# Patient Record
Sex: MALE | Race: WHITE | NOT HISPANIC OR LATINO | Employment: OTHER | URBAN - METROPOLITAN AREA
[De-identification: names, ages, dates, MRNs, and addresses within clinical notes are randomized per-mention and may not be internally consistent; named-entity substitution may affect disease eponyms.]

---

## 2018-06-11 ENCOUNTER — OFFICE VISIT CONVERTED (OUTPATIENT)
Dept: SURGERY | Facility: CLINIC | Age: 63
End: 2018-06-11
Attending: UROLOGY

## 2018-07-03 ENCOUNTER — OFFICE VISIT CONVERTED (OUTPATIENT)
Dept: SURGERY | Facility: CLINIC | Age: 63
End: 2018-07-03
Attending: UROLOGY

## 2019-05-03 ENCOUNTER — HOSPITAL ENCOUNTER (OUTPATIENT)
Dept: CARDIOLOGY | Facility: HOSPITAL | Age: 64
Discharge: HOME | End: 2019-05-03
Attending: INTERNAL MEDICINE

## 2019-07-01 ENCOUNTER — HOSPITAL ENCOUNTER (OUTPATIENT)
Dept: LAB | Facility: HOSPITAL | Age: 64
Discharge: HOME | End: 2019-07-01

## 2019-07-01 LAB — PSA SERPL-MCNC: 12.88 NG/ML (ref 0–4)

## 2019-07-08 ENCOUNTER — OFFICE VISIT CONVERTED (OUTPATIENT)
Dept: SURGERY | Facility: CLINIC | Age: 64
End: 2019-07-08
Attending: UROLOGY

## 2019-08-02 ENCOUNTER — OFFICE VISIT CONVERTED (OUTPATIENT)
Dept: SURGERY | Facility: CLINIC | Age: 64
End: 2019-08-02
Attending: UROLOGY

## 2019-08-21 ENCOUNTER — HOSPITAL ENCOUNTER (OUTPATIENT)
Dept: PERIOP | Facility: HOSPITAL | Age: 64
Setting detail: HOSPITAL OUTPATIENT SURGERY
Discharge: HOME | End: 2019-08-21

## 2019-09-03 ENCOUNTER — CONVERSION ENCOUNTER (OUTPATIENT)
Dept: SURGERY | Facility: CLINIC | Age: 64
End: 2019-09-03

## 2019-09-03 ENCOUNTER — OFFICE VISIT CONVERTED (OUTPATIENT)
Dept: SURGERY | Facility: CLINIC | Age: 64
End: 2019-09-03
Attending: UROLOGY

## 2019-10-01 ENCOUNTER — OFFICE VISIT CONVERTED (OUTPATIENT)
Dept: CARDIOLOGY | Facility: CLINIC | Age: 64
End: 2019-10-01
Attending: SPECIALIST

## 2019-11-07 ENCOUNTER — CONVERSION ENCOUNTER (OUTPATIENT)
Dept: CARDIOLOGY | Facility: CLINIC | Age: 64
End: 2019-11-07
Attending: SPECIALIST

## 2020-02-19 ENCOUNTER — HOSPITAL ENCOUNTER (OUTPATIENT)
Dept: GASTROENTEROLOGY | Facility: HOSPITAL | Age: 65
Setting detail: HOSPITAL OUTPATIENT SURGERY
Discharge: HOME | End: 2020-02-19
Attending: INTERNAL MEDICINE

## 2020-05-29 ENCOUNTER — TELEMEDICINE CONVERTED (OUTPATIENT)
Dept: CARDIOLOGY | Facility: CLINIC | Age: 65
End: 2020-05-29
Attending: SPECIALIST

## 2020-10-13 ENCOUNTER — OFFICE VISIT CONVERTED (OUTPATIENT)
Dept: OTOLARYNGOLOGY | Facility: CLINIC | Age: 65
End: 2020-10-13
Attending: OTOLARYNGOLOGY

## 2020-10-14 ENCOUNTER — HOSPITAL ENCOUNTER (OUTPATIENT)
Dept: PREADMISSION TESTING | Facility: HOSPITAL | Age: 65
Discharge: HOME | End: 2020-10-14
Attending: OTOLARYNGOLOGY

## 2020-10-15 ENCOUNTER — HOSPITAL ENCOUNTER (OUTPATIENT)
Dept: LAB | Facility: HOSPITAL | Age: 65
Discharge: HOME | End: 2020-10-15

## 2020-10-15 LAB — PSA SERPL-MCNC: 10.36 NG/ML (ref 0–4)

## 2020-10-16 LAB — SARS-COV-2 RNA SPEC QL NAA+PROBE: NOT DETECTED

## 2020-10-19 ENCOUNTER — HOSPITAL ENCOUNTER (OUTPATIENT)
Dept: PERIOP | Facility: HOSPITAL | Age: 65
Setting detail: HOSPITAL OUTPATIENT SURGERY
Discharge: HOME | End: 2020-10-19
Attending: OTOLARYNGOLOGY

## 2020-10-27 ENCOUNTER — OFFICE VISIT CONVERTED (OUTPATIENT)
Dept: OTOLARYNGOLOGY | Facility: CLINIC | Age: 65
End: 2020-10-27
Attending: OTOLARYNGOLOGY

## 2020-10-29 ENCOUNTER — OFFICE VISIT CONVERTED (OUTPATIENT)
Dept: UROLOGY | Facility: CLINIC | Age: 65
End: 2020-10-29
Attending: UROLOGY

## 2020-11-05 ENCOUNTER — OFFICE VISIT CONVERTED (OUTPATIENT)
Dept: OTOLARYNGOLOGY | Facility: CLINIC | Age: 65
End: 2020-11-05
Attending: OTOLARYNGOLOGY

## 2020-11-24 ENCOUNTER — OFFICE VISIT CONVERTED (OUTPATIENT)
Dept: CARDIOLOGY | Facility: CLINIC | Age: 65
End: 2020-11-24
Attending: SPECIALIST

## 2020-12-11 ENCOUNTER — OFFICE VISIT CONVERTED (OUTPATIENT)
Dept: OTOLARYNGOLOGY | Facility: CLINIC | Age: 65
End: 2020-12-11
Attending: OTOLARYNGOLOGY

## 2021-03-16 ENCOUNTER — OFFICE VISIT CONVERTED (OUTPATIENT)
Dept: OTOLARYNGOLOGY | Facility: CLINIC | Age: 66
End: 2021-03-16
Attending: OTOLARYNGOLOGY

## 2021-05-10 NOTE — H&P
History and Physical      Patient Name: Michael Martin   Patient ID: [id number]   Sex: Male   YOB: 1955    Primary Care Provider: Uri Coughlin MD   Referring Provider: Seng Robles MD    Visit Date: October 13, 2020    Provider: Fernando Phillip MD   Location: [facility]   Location Address: [address] [place] [address], [address]  [id number]   Location Phone: [phone]          Chief Complaint     1.  Left temple infiltrative basal cell carcinoma       History Of Present Illness  Michael Martin is a 65 year old /White male who presents to the office today as a consult from Seng Robles MD.      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  He was referred by Dr. Robles, his Mohs surgeon.  The patient previously underwent a Mohs procedure 5 years ago and at that time was told that if there was any recurrence at this site that the patient may need radiation therapy instead of further surgery.  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  His eye closure was normal but he did have some lateralization of the lateral canthus after the procedure.  He noted abnormality along the scar several months ago and shave biopsy confirmed recurrence of the tumor.  He has had some discomfort at the site.  There was some concern that there may be deeper extension due to the physical exam finding of the tumor being fixed, as well as the concern of the proximity of the tumor to the eyelids.  I was consulted and discussed the case with Dr. Roblse over the phone prior to the patient's arrival here.       Past Medical History  Cancer of prostate; Heart Murmur; HTN (hypertension); Skin cancer; Tachycardia         Past Surgical History  Appendectomy; Colonoscopy; Prostate Biopsy; skin cancer  "removed         Medication List  Atacand 4 mg oral tablet; Crestor 10 mg oral tablet; Lotrel 5-20 mg oral capsule; metoprolol succinate 25 mg oral tablet extended release 24 hr; Toprol XL 25 mg oral tablet extended release 24 hr         Allergy List  NO KNOWN DRUG ALLERGIES         Family Medical History  No family history of colorectal cancer; Family history of diabetes mellitus         Social History  Alcohol (Current some day); ; Retired; Tobacco (Never)         Review of Systems  · Constitutional  o Denies  o : fever, night sweats, weight loss  · Eyes  o Denies  o : discharge from eye, impaired vision  · HENT  o Admits  o : *See HPI  · Cardiovascular  o Denies  o : chest pain, irregular heart beats  · Respiratory  o Denies  o : shortness of breath, wheezing, coughing up blood  · Gastrointestinal  o Denies  o : heartburn, reflux, vomiting blood  · Genitourinary  o Denies  o : frequency  · Integument  o Denies  o : rash, skin dryness  · Neurologic  o Denies  o : seizures, loss of balance, loss of consciousness, dizziness  · Endocrine  o Denies  o : cold intolerance, heat intolerance  · Heme-Lymph  o Denies  o : easy bleeding, anemia      Vitals  Date Time BP Position Site L\R Cuff Size HR RR TEMP (F) WT  HT  BMI kg/m2 BSA m2 O2 Sat FR L/min FiO2 HC       10/13/2020 10:16 AM        97.2 205lbs 4oz 5'  10.5\" 29.03 2.15             Physical Examination  · Constitutional  o Appearance  o : well developed, well-nourished, alert and in no acute distress, voice clear and strong  · Head and Face  o Head  o :   § Inspection  § : no deformities or lesions  o Face  o :   § Inspection  § : Contracted scar with ulcerative lesion along the left temporal area with decreased mobility, lateralization of the lateral canthus; House-Brackmann I/VI bilaterally  § Palpation  § : No TMJ crepitus nor  muscle tenderness bilaterally  · Eyes  o Vision  o :   § Visual Fields  § : Extraocular movements are intact. No " spontaneous or gaze-induced nystagmus.  o Conjunctivae  o : clear  o Sclerae  o : clear  o Pupils and Irises  o : pupils equal, round, and reactive to light.   · Ears, Nose, Mouth and Throat  o Ears  o :   § External Ears  § : appearance within normal limits, no lesions present  § Otoscopic Examination  § : tympanic membrane appearance within normal limits bilaterally without perforations, well-aerated middle ears  § Hearing  § : intact to conversational voice both ears  o Nose  o :   § External Nose  § : appearance normal  § Intranasal Exam  § : mucosa within normal limits, vestibules normal, no intranasal lesions present, septum midline, sinuses non tender to percussion  o Oral Cavity  o :   § Oral Mucosa  § : oral mucosa normal without pallor or cyanosis  § Lips  § : lip appearance normal  § Teeth  § : normal dentition for age  § Gums  § : gums pink, non-swollen, no bleeding present  § Tongue  § : tongue appearance normal; normal mobility  § Palate  § : hard palate normal, soft palate appearance normal with symmetric mobility  o Throat  o :   § Oropharynx  § : no inflammation or lesions present, tonsils within normal limits  § Hypopharynx  § : appearance within normal limits, superior epiglottis within normal limits  § Larynx  § : appearance within normal limits, vocal cords within normal limits, no lesions present  · Neck  o Inspection/Palpation  o : normal appearance, no masses or tenderness, trachea midline; thyroid size normal, nontender, no nodules or masses present on palpation  · Respiratory  o Respiratory Effort  o : breathing unlabored  o Inspection of Chest  o : normal appearance, no retractions  · Cardiovascular  o Heart  o : regular rate and rhythm  · Lymphatic  o Neck  o : no lymphadenopathy present  o Supraclavicular Nodes  o : no lymphadenopathy present  o Preauricular Nodes  o : no lymphadenopathy present  · Skin and Subcutaneous Tissue  o General Inspection  o : Regarding face and neck - there  are no rashes present, no lesions present, and no areas of discoloration  · Neurologic  o Cranial Nerves  o : cranial nerves II-XII are grossly intact bilaterally  o Gait and Station  o : normal gait, able to stand without diffculty  · Psychiatric  o Judgement and Insight  o : judgment and insight intact  o Mood and Affect  o : mood normal, affect appropriate          Assessment  · Pre-Surgical Orders     V72.84/Z01.818  · Infiltrative basal cell carcinoma     173.91/C44.91      Plan  · Orders  o General Surgery Order (GENOR) - V72.84/Z01.818 - 10/13/2020  · Medications  o Medications have been Reconciled  o Transition of Care or Provider Policy  · Instructions  o PLAN: Wide local excision of left temporal area/face recurrent infiltrative basal cell carcinoma, split-thickness skin graft  o Handouts Provided-Pre-Procedure Instructions including date and time and location of procedure.  o ****Surgical Orders****  o ****Patient Status****  o Outpatient  o ********************  o RISK AND BENEFITS: Reaction to anesthesia, pain, swelling, bleeding, infection, damage to surrounding structures, weakness or inability to completely close left eye, need for further procedures, recurrence of tumor, scar.  o Consent for surgery: Given these options, the patient has verbally expressed an understanding of the risks of surgery and finds these risks acceptable. We will proceed with surgery as soon as possible.  o Consult Anesthesia for a post-operative block, or any pain management procedure deemed necessary by the anesthesiologist for adequate post-operative pain control.   o O.R. PREP: Per protocol  o IV: Per Anesthesia  o PLEASE SIGN PERMIT FOR: Wide local excision of left temporal area/face recurrent infiltrative basal cell carcinoma, split-thickness skin graft  o *___The above History and Physical Examination has been completed within 30 days of admission.  o He presents the clinic today for evaluation of a recurrent  infiltrative basal cell carcinoma along the left temple. He was referred by Dr. Robles, his Mohs surgeon. The patient previously underwent a Mohs procedure 5 years ago and at that time was told that if there was any recurrence at this site that the patient may need radiation therapy instead of further surgery. 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 His eye closure was normal but he did have some lateralization of the lateral canthus after the procedure. He noted abnormality along the scar several months ago and shave biopsy confirmed recurrence of the tumor. He has had some discomfort at the site. There was some concern that there may be deeper extension due to the physical exam finding of the tumor being fixed, as well as the concern of the proximity of the tumor to the eyelids. I was consulted and discussed the case with Dr. Robles over the phone prior to the patient's arrival here. On examination he does have a lesion along the temporal area overlying his previous surgical site incision and scar. There was swelling and induration of the skin surrounding the area and the skin is partially fixed to the underlying tissues. Lesion borders extend within 7 mm of the lateral canthus superiorly. I had a lengthy discussion today with the patient and based on the recurrent nature of the tumor as well as the site, I would recommend wide local excision with likely split-thickness skin graft reconstruction. We discussed the procedure at length, including the possible complications as well as need to sacrifice lower branches of the facial nerve that may lead to incomplete eye closure. He understands that this may require him to have further procedures to achieve eye closure. I discussed obtaining a split-thickness skin graft from his lateral thigh. He notes that he understands and would like to  proceed. I will make arrangements to have him scheduled for this in the near future.  o Electronically Identified Patient Education Materials Provided Electronically  · Correspondence  o ENT Letter to Referring MD (Seng Robles MD) - 10/14/2020            Electronically Signed by: Fernando Phillip MD -Author on October 14, 2020 02:40:10 PM

## 2021-05-13 NOTE — PROGRESS NOTES
Progress Note      Patient Name: Michael Martin   Patient ID: [id number]   Sex: Male   YOB: 1955    Primary Care Provider: Uri Coughlin MD   Referring Provider: Seng Robles MD    Visit Date: November 5, 2020    Provider: Fernando Phillip MD   Location: [facility]   Location Address: [address] [place] [address], [address]  [id number]   Location Phone: [phone]          Chief Complaint     1.  Infiltrating basal cell carcinoma of the left temple       History Of Present Illness  Michael Martin is a 65 year old /White male who presents to the office today for a follow-up visit.      He presents the clinic today with some concerns regarding his surgical site after having excision of a recurrent infiltrative basal cell carcinoma of the left temple as well as skin grafting.  He noted that the skin graft appeared slightly different, and wanted to have this evaluated.  He has not had any significant pain at the surgical site, and has been healing well at both the resection site on his face, as well as the skin graft donor site.       Past Medical History  Cancer of prostate; Facial lesion; Heart Murmur; HTN (hypertension); Skin cancer; Tachycardia         Past Surgical History  Appendectomy; Colonoscopy; lesion removal; Prostate Biopsy; skin cancer removed         Medication List  Atacand 4 mg oral tablet; Crestor 10 mg oral tablet; Lotrel 5-20 mg oral capsule; sildenafil 100 mg oral tablet; Toprol XL 25 mg oral tablet extended release 24 hr         Allergy List  NO KNOWN DRUG ALLERGIES         Family Medical History  No family history of colorectal cancer; Family history of diabetes mellitus         Social History  Alcohol (Current some day); ; Retired; Tobacco (Never)         Review of Systems  · Constitutional  o Denies  o : fever, night sweats, weight loss  · Eyes  o Denies  o : discharge from eye, impaired vision  · HENT  o Admits  o : *See  "HPI  · Cardiovascular  o Denies  o : chest pain, irregular heart beats  · Respiratory  o Denies  o : shortness of breath, wheezing, coughing up blood  · Gastrointestinal  o Denies  o : heartburn, reflux, vomiting blood  · Genitourinary  o Denies  o : frequency  · Integument  o Denies  o : rash, skin dryness  · Neurologic  o Denies  o : seizures, loss of balance, loss of consciousness, dizziness  · Endocrine  o Denies  o : cold intolerance, heat intolerance  · Heme-Lymph  o Denies  o : easy bleeding, anemia      Vitals  Date Time BP Position Site L\R Cuff Size HR RR TEMP (F) WT  HT  BMI kg/m2 BSA m2 O2 Sat FR L/min FiO2 HC       11/05/2020 01:59 PM        96.6 203lbs 0oz 5'  10.5\" 28.72 2.14             Physical Examination  · Constitutional  o Appearance  o : well developed, well-nourished, alert and in no acute distress, voice clear and strong  · Head and Face  o Head  o :   § Inspection  § : no deformities or lesions  o Face  o :   § Inspection  § : Well-healing split-thickness skin graft along the left temple with 100% take and viability, sutures dissolving; House-Brackmann I/VI bilaterally  § Palpation  § : No TMJ crepitus nor  muscle tenderness bilaterally  · Eyes  o Vision  o :   § Visual Fields  § : Extraocular movements are intact. No spontaneous or gaze-induced nystagmus.  o Conjunctivae  o : clear  o Sclerae  o : clear  o Pupils and Irises  o : pupils equal, round, and reactive to light.   · Ears, Nose, Mouth and Throat  o Ears  o :   § External Ears  § : appearance within normal limits, no lesions present  § Otoscopic Examination  § : tympanic membrane appearance within normal limits bilaterally without perforations, well-aerated middle ears  § Hearing  § : intact to conversational voice both ears  o Nose  o :   § External Nose  § : appearance normal  § Intranasal Exam  § : mucosa within normal limits, vestibules normal, no intranasal lesions present, septum midline, sinuses non tender to " percussion  o Oral Cavity  o :   § Oral Mucosa  § : oral mucosa normal without pallor or cyanosis  § Lips  § : lip appearance normal  § Teeth  § : normal dentition for age  § Gums  § : gums pink, non-swollen, no bleeding present  § Tongue  § : tongue appearance normal; normal mobility  § Palate  § : hard palate normal, soft palate appearance normal with symmetric mobility  o Throat  o :   § Oropharynx  § : no inflammation or lesions present, tonsils within normal limits  § Hypopharynx  § : appearance within normal limits, superior epiglottis within normal limits  § Larynx  § : appearance within normal limits, vocal cords within normal limits, no lesions present  · Neck  o Inspection/Palpation  o : normal appearance, no masses or tenderness, trachea midline; thyroid size normal, nontender, no nodules or masses present on palpation  · Respiratory  o Respiratory Effort  o : breathing unlabored  o Inspection of Chest  o : normal appearance, no retractions  · Cardiovascular  o Heart  o : regular rate and rhythm  · Lymphatic  o Neck  o : no lymphadenopathy present  o Supraclavicular Nodes  o : no lymphadenopathy present  o Preauricular Nodes  o : no lymphadenopathy present  · Skin and Subcutaneous Tissue  o General Inspection  o : Regarding face and neck - there are no rashes present, no lesions present, and no areas of discoloration  · Neurologic  o Cranial Nerves  o : cranial nerves II-XII are grossly intact bilaterally  o Gait and Station  o : normal gait, able to stand without diffculty  · Psychiatric  o Judgement and Insight  o : judgment and insight intact  o Mood and Affect  o : mood normal, affect appropriate          Assessment  · Basal cell carcinoma     173.91/C44.91      Plan  · Medications  o Medications have been Reconciled  o Transition of Care or Provider Policy  · Instructions  o He presents the clinic today with some concerns regarding his surgical site after having excision of a recurrent infiltrative  basal cell carcinoma of the left temple as well as skin grafting. He noted that the skin graft appeared slightly different, and wanted to have this evaluated. He has not had any significant pain at the surgical site, and has been healing well at both the resection site on his face, as well as the skin graft donor site. On examination the trickle site appears to be healing well and the skin graft is 100% viable. The donor site appears well-healing. I reassured him, and we will plan to see him back in the clinic as previously scheduled, or sooner should there be any issues.  o Electronically Identified Patient Education Materials Provided Electronically            Electronically Signed by: Fernando Phillip MD -Author on November 12, 2020 12:51:47 PM

## 2021-05-13 NOTE — PROGRESS NOTES
Progress Note      Patient Name: Michael Martin   Patient ID: [id number]   Sex: Male   YOB: 1955    Primary Care Provider: Uri Coughlin MD   Referring Provider: Seng Robles MD    Visit Date: October 29, 2020    Provider: Megan Whiteside MD   Location: [facility]   Location Address: [address] [place] [address]  [id number]   Location Phone: [phone]          Chief Complaint  · pt is here for urological concerns      History Of Present Illness     65yo male referred by Dr. Mitchell for prostate cancer. He is on AS. He was diagnosed in Dec 2015. PSA at that time was 7.89. TRUS bx showed Rush 3+3 in 2 cores in left sided hypoechoic area.   The patient had repeat PSA in 7/16. At that time, it was 9.8. The patient had repeat TRUS bx and this showed Rush 3+3 in 1 core on the right side. The patient then had a repeat PSA in April 2017 and PSA increased to 17.4. He presents for eval. We discussed his PSA. He denies any new urinary symptoms. He is not on any meds for his prostate.   The patient does not know if there is a family history of prostate cancer in his family.    Patient now presents after having MRI of the prostate. There were no suspicious T2 localizing lesions in the prostate gland. The patnient did have an enlarged gland of 68gm and there was some chronic prostatitis. The patient and I reviewed this study. He would like to remain on AS. I explained that I prefer for patients to have PSA less than 10 to be on AS. He understands that he would be stretching the boundaries for AS.    The patient now presents after having prostate biopsy. He has done well from the biopsy. There was no cancer seen on the biopsy. The patient and I reviewed this. He would like to continue on AS.    The patient now presents in follow up. He is doing well. He denies any new urinary issues. The patient had a repeat PSA. His PSA in April was 17 and it is now down to 14. We reviewed this. The  patient would like to continue on AS. We discussed the need for an annual biopsy while on AS. He is aware of this.    6/11/18 - 61yo male presents in follow up. He is doing well. He had a recent PSA and this has gone down to 11.9 from 14. We discussed this. I would like to get an MRI of the prostate and see if there are any suspicious lesions in the prostate. From there, I would see him back. If that study is negative for suspicious lesions then I would hold on a biopsy. If there is a concerning lesion, I would do a fusion biopsy.    7/3/18 - Patient presents in follow up. The patient had an MRI of the prostate. This showed a volume of 71ml. There was no suspicious T2 signal within the peripheral zone favored to reflect chronic inflammation. There was no focal abnormality suspicious for clinically significant prostate cancer. We discussed this. I discussed holding on a biopsy this year given that his PSA is less and there were no lesions on the MRI. I would like to see him back in 1 year with a PSA. We discussed that typically a TRUS bx is recommended annually for AS patients, however, with his negative MRI. I would like to omit that. I think that next year, the patient will possibly need a biopsy.    7/8/19 - Patient presents in follow up. He is doing well. He denies any urinary issues. The patient had a recent PSA and this has increased slightly to 12.8. We discussed this. His last biopsy was in 2017 and was negative. His MRI was negative for localizing lesions in the prostate to suggest clinically significant prostate cancer. We discussed repeating this study and possibly doing a biopsy after as he has not had one in 2 years. He is agreeable to this.    8/2/19 - Patient presents in follow up. He had an MRI of the prostate. This showed a volume of 81gm. There were no localizing lesions to suggest significant prostate cancer. We discussed that. I discussed that the MRI is not always accurate. I discussed that it  "has been 2 years since last biopsy and his PSA is rising. I would feel better to do a TRUS bx of the prostate. He is in agreement with this. I discussed pain, bleeding, infection, ED as risks. He would like to proceed.    9/3/19 - Patient presents in follow up. He is doing well after his TRUS bx of the prostate. His path showed aytpia and inflammation, but no cancer. We discussed this. I have recommended observation. We discussed the possibility of a false negative and understaging.    10/29/20 - His most recent PSA is 10.36.  His prostate MRI last year was negative.  His biopsy was negative.  We will repeat his prostate MRI given he is on active surveillance.       Past Medical History  Cancer of prostate; Facial lesion; Heart Murmur; HTN (hypertension); Skin cancer; Tachycardia         Past Surgical History  Appendectomy; Colonoscopy; lesion removal; Prostate Biopsy; skin cancer removed         Medication List  Atacand 4 mg oral tablet; Crestor 10 mg oral tablet; Lotrel 5-20 mg oral capsule; sildenafil 100 mg oral tablet; Toprol XL 25 mg oral tablet extended release 24 hr         Allergy List  NO KNOWN DRUG ALLERGIES         Family Medical History  No family history of colorectal cancer; Family history of diabetes mellitus         Social History  Alcohol (Current some day); ; Retired; Tobacco (Never)         Review of Systems  · Constitutional  o Denies  o : chills, fever  · Gastrointestinal  o Denies  o : nausea, vomiting      Vitals  Date Time BP Position Site L\R Cuff Size HR RR TEMP (F) WT  HT  BMI kg/m2 BSA m2 O2 Sat FR L/min FiO2 HC       10/29/2020 04:40 /85 Sitting       204lbs 16oz 5'  10.5\" 29 2.15             Physical Examination  · Constitutional  o Appearance  o : well-nourished, well developed, alert, in no acute distress  · Head and Face  o Head  o :   § Inspection  § : atraumatic, normocephalic  o Face  o :   § Inspection  § : no facial lesions  · Eyes  o Sclerae  o : sclerae " white  · Ears, Nose, Mouth and Throat  o Ears  o :   § External Ears  § : appearance within normal limits, no lesions present  o Nose  o :   § External Nose  § : appearance normal  · Neck  o Inspection/Palpation  o : normal appearance, trachea midline  · Respiratory  o Respiratory Effort  o : breathing unlabored  · Gastrointestinal  o Abdominal Examination  o : abdomen nontender to palpation, tone normal without rigidity or guarding, no masses present, abdominal contour scaphoid  · Neurologic  o Mental Status Examination  o :   § Orientation  § : grossly oriented to person, place and time  § Speech/Language  § : communication ability within normal limits  o Gait and Station  o : normal gait, able to stand without difficulty  · Psychiatric  o Judgement and Insight  o : judgment and insight intact, judgement for everyday activities and social situations within normal limits, insight intact  o Mood and Affect  o : mood normal, affect appropriate          Results  · In-Office Procedures  o Lab procedure  § Automated dipstick urinalysis with microscopy (38152)   § Color Ur: Yellow   § Clarity Ur: Clear   § Glucose Ur Ql Strip: Negative   § Bilirub Ur Ql Strip: Negative   § Ketones Ur Ql Strip: Negative   § Sp Gr Ur Qn: 1.020   § Hgb Ur Ql Strip: Negative   § pH Ur-LsCnc: 5.5   § Prot Ur Ql Strip: Negative   § Urobilinogen Ur Strip-mCnc: 0.2   § Nitrite Ur Ql Strip: Negative   § WBC Est Ur Ql Strip: Trace   § RBC UrnS Qn HPF: 0   § WBC UrnS Qn HPF: 0   § Bacteria UrnS Qn HPF: 0   § Crystals UrnS Qn HPF: 0   § Epithelial Cells (non renal): 0 /HPF  § Epithelial Cells (renal): 0       Assessment  · Cancer of prostate     185/C61      Plan  · Orders  o PSA ultrasensitive DIAGNOSTIC [facility] (28419, 59307) - 185/C61 - 10/29/2020  o MRI prostate wo then w contrast (73940) - 185/C61 - 10/29/2020  · Medications  o Medications have been Reconciled  o Transition of Care or Provider Policy  · Instructions  o I will get a prostate MRI and  call with results.   o Electronically Identified Patient Education Materials Provided Electronically            Electronically Signed by: Megan Whiteside MD -Author on October 29, 2020 05:43:46 PM

## 2021-05-13 NOTE — PROGRESS NOTES
"   Progress Note      Patient Name: Michael Martin   Patient ID: [id number]   Sex: Male   YOB: 1955    Primary Care Provider: Uri Coughlin MD   Referring Provider: Seng Robles MD    Visit Date: November 24, 2020    Provider: Ilan Kim MD   Location: [facility]   Location Address: [address] [place] [address], [facility]   [place]  [id number]   Location Phone: [phone]          Chief Complaint     Palpitations.  Hypertension.       History Of Present Illness  Michael Martin is a 65 year old /White male with a history of hypertension and palpitations. No further palpitations. Blood pressure well controlled at home in the 120/70 range.   CURRENT MEDICATIONS: Lotrel 5-20 mg daily; Atacand 32 mg daily; Crestor 5 mg daily; metoprolol ER 25 mg daily; Viagra 100 mg p.r.n.   PAST MEDICAL HISTORY: Hyperlipidemia; Hypertension; Prostate cancer.   PSYCHOSOCIAL HISTORY: Moderate alcohol consumption.      ALLERGIES:  No known drug allergies.       Review of Systems  · Cardiovascular  o Admits  o : palpitations (fast, fluttering, or skipping beats)  o Denies  o : swelling (feet, ankles, hands), shortness of breath while walking or lying flat, chest pain or angina pectoris   · Respiratory  o Denies  o : chronic or frequent cough      Vitals  Date Time BP Position Site L\R Cuff Size HR RR TEMP (F) WT  HT  BMI kg/m2 BSA m2 O2 Sat FR L/min FiO2 HC       11/24/2020 09:32 /90 Sitting    62 - R   208lbs 0oz 5'  10.5\" 29.42 2.17       11/24/2020 09:32 /90 Sitting    64 - R                   Physical Examination  · Constitutional  o Appearance  o : Awake, alert, cooperative, pleasant.  · Respiratory  o Inspection of Chest  o : No chest wall deformities, moving equal.  o Auscultation of Lungs  o : Good air entry with vesicular breath sounds.  · Cardiovascular  o Heart  o :   § Auscultation of Heart  § : S1 and S2 regular. No S3. No S4.   o Peripheral Vascular System  o : "   § Extremities  § : Peripheral pulses were well felt. No edema. No cyanosis.  · Gastrointestinal  o Abdominal Examination  o : No masses or organomegaly noted.  · EKG  o EKG  o : Performed in the office today.  o Indications  o : Palpitations.  o Results  o : Sinus arrhythmia. Borderline first-degree AV block. Left atrial enlargement. Nonspecific T-wave abnormalities.           Assessment     ASSESSMENT & PLAN:    1.  Stable palpitations/paroxysmal SVT.  Continue metoprolol.  2.  Essential hypertension, controlled.  Continue Atacand and Lotrel.  3.  See me back in 6 months.             Electronically Signed by: Sofia Mehta-, Other -Author on December 1, 2020 12:05:21 PM  Electronically Co-signed by: Ilan Kim MD -Reviewer on December 16, 2020 10:50:38 AM

## 2021-05-13 NOTE — PROGRESS NOTES
Progress Note      Patient Name: Michael Martin   Patient ID: [id number]   Sex: Male   YOB: 1955    Primary Care Provider: Uri Coughlin MD   Referring Provider: Seng Robles MD    Visit Date: December 11, 2020    Provider: Fernando Phillip MD   Location: [facility]   Location Address: [address] [place] [address], [address]  [id number]   Location Phone: [phone]          Chief Complaint     1.  Basal cell carcinoma, infiltrating, of the left temple       History Of Present Illness  Michael Martin is a 65 year old /White male who presents to the office today for a follow-up visit.      Presents the clinic today for follow-up regarding recurrent left temporal infiltrating basal cell carcinoma which was excised and reconstructed using a split-thickness skin graft.  He has been doing very well, and has had no issues since I last saw him in the clinic a month ago.  The site has been healing well and he is having no issues.  His left thigh skin graft harvest site is completely healed.  He denies any pain or issues at the site.       Past Medical History  Cancer of prostate; Facial lesion; Heart Murmur; HTN (hypertension); Skin cancer; Tachycardia         Past Surgical History  Appendectomy; Colonoscopy; lesion removal; Prostate Biopsy; skin cancer removed         Medication List  Atacand 4 mg oral tablet; Crestor 10 mg oral tablet; Lotrel 5-20 mg oral capsule; sildenafil 100 mg oral tablet; Toprol XL 25 mg oral tablet extended release 24 hr         Allergy List  NO KNOWN DRUG ALLERGIES         Family Medical History  No family history of colorectal cancer; Family history of diabetes mellitus         Social History  Alcohol (Current some day); ; Retired; Tobacco (Never)         Review of Systems  · Constitutional  o Denies  o : fever, night sweats, weight loss  · Eyes  o Denies  o : discharge from eye, impaired vision  · HENT  o Admits  o : *See  "HPI  · Cardiovascular  o Denies  o : chest pain, irregular heart beats  · Respiratory  o Denies  o : shortness of breath, wheezing, coughing up blood  · Gastrointestinal  o Denies  o : heartburn, reflux, vomiting blood  · Genitourinary  o Denies  o : frequency  · Integument  o Denies  o : rash, skin dryness  · Neurologic  o Denies  o : seizures, loss of balance, loss of consciousness, dizziness  · Endocrine  o Denies  o : cold intolerance, heat intolerance  · Heme-Lymph  o Denies  o : easy bleeding, anemia      Vitals  Date Time BP Position Site L\R Cuff Size HR RR TEMP (F) WT  HT  BMI kg/m2 BSA m2 O2 Sat FR L/min FiO2 HC       12/11/2020 01:19 PM        97.4 210lbs 4oz 5'  10.5\" 29.74 2.18             Physical Examination  · Constitutional  o Appearance  o : well developed, well-nourished, alert and in no acute distress, voice clear and strong  · Head and Face  o Head  o :   § Inspection  § : Left temporal skin graft with 100% take, well-healed, no evidence of recurrent cancer  o Face  o :   § Inspection  § : No facial lesions; House-Brackmann I/VI bilaterally  § Palpation  § : No TMJ crepitus nor  muscle tenderness bilaterally  · Eyes  o Vision  o :   § Visual Fields  § : Extraocular movements are intact. No spontaneous or gaze-induced nystagmus.  o Conjunctivae  o : clear  o Sclerae  o : clear  o Pupils and Irises  o : pupils equal, round, and reactive to light.   · Ears, Nose, Mouth and Throat  o Ears  o :   § External Ears  § : appearance within normal limits, no lesions present  § Otoscopic Examination  § : tympanic membrane appearance within normal limits bilaterally without perforations, well-aerated middle ears  § Hearing  § : intact to conversational voice both ears  o Nose  o :   § External Nose  § : appearance normal  § Intranasal Exam  § : mucosa within normal limits, vestibules normal, no intranasal lesions present, septum midline, sinuses non tender to percussion  o Oral Cavity  o :   § Oral " Mucosa  § : oral mucosa normal without pallor or cyanosis  § Lips  § : lip appearance normal  § Teeth  § : normal dentition for age  § Gums  § : gums pink, non-swollen, no bleeding present  § Tongue  § : tongue appearance normal; normal mobility  § Palate  § : hard palate normal, soft palate appearance normal with symmetric mobility  o Throat  o :   § Oropharynx  § : no inflammation or lesions present, tonsils within normal limits  § Hypopharynx  § : appearance within normal limits, superior epiglottis within normal limits  § Larynx  § : appearance within normal limits, vocal cords within normal limits, no lesions present  · Neck  o Inspection/Palpation  o : normal appearance, no masses or tenderness, trachea midline; thyroid size normal, nontender, no nodules or masses present on palpation  · Respiratory  o Respiratory Effort  o : breathing unlabored  o Inspection of Chest  o : normal appearance, no retractions  · Cardiovascular  o Heart  o : regular rate and rhythm  · Lymphatic  o Neck  o : no lymphadenopathy present  o Supraclavicular Nodes  o : no lymphadenopathy present  o Preauricular Nodes  o : no lymphadenopathy present  · Skin and Subcutaneous Tissue  o General Inspection  o : Regarding face and neck - there are no rashes present, no lesions present, and no areas of discoloration  · Neurologic  o Cranial Nerves  o : cranial nerves II-XII are grossly intact bilaterally  o Gait and Station  o : normal gait, able to stand without diffculty  · Psychiatric  o Judgement and Insight  o : judgment and insight intact  o Mood and Affect  o : mood normal, affect appropriate          Assessment  · Basal cell carcinoma     173.91/C44.91      Plan  · Medications  o Medications have been Reconciled  o Transition of Care or Provider Policy  · Instructions  o Presents the clinic today for follow-up regarding recurrent left temporal infiltrating basal cell carcinoma which was excised and reconstructed using a split-thickness  skin graft. He has been doing very well, and has had no issues since I last saw him in the clinic a month ago. The site has been healing well and he is having no issues. His left thigh skin graft harvest site is completely healed. He denies any pain or issues at the site. On examination the surgical site appears well-healed with no evidence of recurrent cancer. His thigh is also well-healed. I will plan to see him back in the clinic in 3 months to reevaluate the site, or sooner should there be any issues.  o Electronically Identified Patient Education Materials Provided Electronically  · Correspondence  o ENT Letter to Referring MD (Seng Robles MD) - 12/15/2020            Electronically Signed by: Fernando Phillip MD -Author on December 15, 2020 02:16:15 PM

## 2021-05-13 NOTE — PROGRESS NOTES
Progress Note      Patient Name: Michael Martin   Patient ID: [id number]   Sex: Male   YOB: 1955    Primary Care Provider: Uri Coughlin MD   Referring Provider: Seng Robles MD    Visit Date: October 27, 2020    Provider: Fernando Phillip MD   Location: [facility]   Location Address: [address] [place] [address], [address] [place] [address]  [id number]   Location Phone: [phone]          Chief Complaint     1.  Infiltrating basal cell carcinoma, left temple of the face       History Of Present Illness  Michael Martin is a 65 year old /White male who presents to the office today for a follow-up visit.      He presents the clinic today for follow-up after having surgical excision of a recurrent infiltrative basal cell carcinoma of the left face.  He notes that he has been doing well, and has not had any issues after the procedure.  He notes that the pain is dissipating and he is no longer requiring any pain medications.  The bolster has stayed on.    Pathology is consistent with basal cell carcinoma, infiltrating type.  Close margins between 12 and 3:00 in 6-9 o'clock, but are clear.  Frozen section sent intraoperatively off of the posterior aspect of the zygomatic process of the maxilla were negative for carcinoma.       Past Medical History  Cancer of prostate; Facial lesion; Heart Murmur; HTN (hypertension); Skin cancer; Tachycardia         Past Surgical History  Appendectomy; Colonoscopy; lesion removal; Prostate Biopsy; skin cancer removed         Medication List  Atacand 4 mg oral tablet; Crestor 10 mg oral tablet; Lotrel 5-20 mg oral capsule; metoprolol succinate 25 mg oral tablet extended release 24 hr; Toprol XL 25 mg oral tablet extended release 24 hr         Allergy List  NO KNOWN DRUG ALLERGIES         Family Medical History  No family history of colorectal cancer; Family history of diabetes mellitus         Social History  Alcohol (Current some day); ; Retired;  "Tobacco (Never)         Review of Systems  · Constitutional  o Denies  o : fever, night sweats, weight loss  · Eyes  o Denies  o : discharge from eye, impaired vision  · HENT  o Admits  o : *See HPI  · Cardiovascular  o Denies  o : chest pain, irregular heart beats  · Respiratory  o Denies  o : shortness of breath, wheezing, coughing up blood  · Gastrointestinal  o Denies  o : heartburn, reflux, vomiting blood  · Genitourinary  o Denies  o : frequency  · Integument  o Denies  o : rash, skin dryness  · Neurologic  o Denies  o : seizures, loss of balance, loss of consciousness, dizziness  · Endocrine  o Denies  o : cold intolerance, heat intolerance  · Heme-Lymph  o Denies  o : easy bleeding, anemia      Vitals  Date Time BP Position Site L\R Cuff Size HR RR TEMP (F) WT  HT  BMI kg/m2 BSA m2 O2 Sat FR L/min FiO2 HC       10/27/2020 10:54 AM        97.4 207lbs 0oz 5'  10.5\" 29.28 2.16             Physical Examination  · Constitutional  o Appearance  o : well developed, well-nourished, alert and in no acute distress, voice clear and strong  · Head and Face  o Head  o :   § Inspection  § : no deformities or lesions  o Face  o :   § Inspection  § : The bolster dressing and staples were removed, revealing skin graft with 100% take.; House-Brackmann I/VI bilaterally, weakness of the frontalis muscle on the left which was present preoperatively, no weakness of eye closure on the left.  § Palpation  § : No TMJ crepitus nor  muscle tenderness bilaterally  · Eyes  o Vision  o :   § Visual Fields  § : Extraocular movements are intact. No spontaneous or gaze-induced nystagmus.  o Conjunctivae  o : clear  o Sclerae  o : clear  o Pupils and Irises  o : pupils equal, round, and reactive to light.   · Ears, Nose, Mouth and Throat  o Ears  o :   § External Ears  § : appearance within normal limits, no lesions present  § Otoscopic Examination  § : tympanic membrane appearance within normal limits bilaterally without " perforations, well-aerated middle ears  § Hearing  § : intact to conversational voice both ears  o Nose  o :   § External Nose  § : appearance normal  § Intranasal Exam  § : mucosa within normal limits, vestibules normal, no intranasal lesions present, septum midline, sinuses non tender to percussion  o Oral Cavity  o :   § Oral Mucosa  § : oral mucosa normal without pallor or cyanosis  § Lips  § : lip appearance normal  § Teeth  § : normal dentition for age  § Gums  § : gums pink, non-swollen, no bleeding present  § Tongue  § : tongue appearance normal; normal mobility  § Palate  § : hard palate normal, soft palate appearance normal with symmetric mobility  o Throat  o :   § Oropharynx  § : no inflammation or lesions present, tonsils within normal limits  § Hypopharynx  § : appearance within normal limits, superior epiglottis within normal limits  § Larynx  § : appearance within normal limits, vocal cords within normal limits, no lesions present  · Neck  o Inspection/Palpation  o : normal appearance, no masses or tenderness, trachea midline; thyroid size normal, nontender, no nodules or masses present on palpation  · Respiratory  o Respiratory Effort  o : breathing unlabored  o Inspection of Chest  o : normal appearance, no retractions  · Cardiovascular  o Heart  o : regular rate and rhythm  · Lymphatic  o Neck  o : no lymphadenopathy present  o Supraclavicular Nodes  o : no lymphadenopathy present  o Preauricular Nodes  o : no lymphadenopathy present  · Skin and Subcutaneous Tissue  o General Inspection  o : Regarding face and neck - there are no rashes present, no lesions present, and no areas of discoloration  · Neurologic  o Cranial Nerves  o : cranial nerves II-XII are grossly intact bilaterally  o Gait and Station  o : normal gait, able to stand without diffculty  · Psychiatric  o Judgement and Insight  o : judgment and insight intact  o Mood and Affect  o : mood normal, affect  appropriate          Assessment  · Basal cell carcinoma     173.91/C44.91      Plan  · Medications  o Medications have been Reconciled  o Transition of Care or Provider Policy  · Instructions  o He presents the clinic today for follow-up after having surgical excision of a recurrent infiltrative basal cell carcinoma of the left face. He notes that he has been doing well, and has not had any issues after the procedure. He notes that the pain is dissipating and he is no longer requiring any pain medications. The bolster has stayed on.Pathology is consistent with basal cell carcinoma, infiltrating type. Close margins between 12 and 3:00 in 6-9 o'clock, but are clear. Frozen section sent intraoperatively off of the posterior aspect of the zygomatic process of the maxilla were negative for carcinoma. On examination the bolster was removed and the skin graft appears to have 100% take. The wound is well-healing. I will have him continue doing some wound care at home. The split-thickness skin graft harvest site on the leg appears to be healing well, and I removed some of the dressing. I will plan to see him back in the clinic in 6 weeks or sooner should there be any issues.  o Electronically Identified Patient Education Materials Provided Electronically  · Correspondence  o ENT Letter to Referring MD (Seng Robles MD) - 10/27/2020            Electronically Signed by: Fernando Phillip MD -Author on October 27, 2020 03:23:16 PM

## 2021-05-13 NOTE — PROGRESS NOTES
Progress Note      Patient Name: Michael Martin   Patient ID: [id number]   Sex: Male   YOB: 1955    Primary Care Provider: Uri Coughlin MD   Referring Provider: Uir Coughlin MD    Visit Date: May 29, 2020    Provider: Ilan Kim MD   Location: [facility]   Location Address: [address] [place] [address]   [place]  [id number]   Location Phone: [phone]          History Of Present Illness  TELEHEALTH TELEPHONE VISIT  Michael Martin is a 64 year old /White male who is presenting for evaluation via telehealth telephone visit. Verbal consent obtained before beginning visit. Telehealth due to COVID-19. He has a history of hypertension and hyperlipidemia. Blood pressure is well controlled at home. No further palpitations.   Provider spent 5 minutes with the patient during the telehealth visit.   The following staff were present during this visit: Provider only.   CURRENT MEDICATIONS: include Toprol XL 25 mg daily; Atacand 32 mg daily; Lotrel 5/20 mg daily; Crestor 5 mg daily; Viagra 100 mg p.r.n. The dosage and frequency of the medications were reviewed with the patient.   PAST MEDICAL HISTORY: Hyperlipidemia; hypertension; prostate cancer.   FAMILY HISTORY: Unknown.   PSYCHOSOCIAL HISTORY: Unknown.       Review of Systems  · Cardiovascular  o Admits  o : palpitations (fast, fluttering, or skipping beats)  o Denies  o : swelling (feet, ankles, hands), shortness of breath while walking or lying flat, chest pain or angina pectoris   · Respiratory  o Denies  o : chronic or frequent cough, asthma or wheezing      Vitals     Per patient, at-home vitals are blood pressure 126/76, heart rate of 70.           Assessment     ASSESSMENT AND PLAN:    1.  Palpitations stable:  Continue current dose of Toprol.  2.  Essential hypertension controlled:  Continue current dose of Lotrel and Atacand.  3.  Hyperlipidemia:  Continue Crestor, managed by his PMD.    Ilan  MD Judy, [facility]  STUART/dmd           This note was transcribed by Marisa Drummond.  dmd/STUART  The above service was transcribed by Marisa Drummond, and I attest to the accuracy of the note.  STUART         Plan  · Instructions  o Plan Of Care:             Electronically Signed by: Marisa Drummond-, -Author on June 5, 2020 09:36:43 AM  Electronically Co-signed by: Ilan Kim MD -Reviewer on June 20, 2020 11:01:46 AM

## 2021-05-14 VITALS
DIASTOLIC BLOOD PRESSURE: 90 MMHG | SYSTOLIC BLOOD PRESSURE: 146 MMHG | BODY MASS INDEX: 29.78 KG/M2 | WEIGHT: 208 LBS | HEART RATE: 64 BPM | HEIGHT: 70 IN

## 2021-05-14 VITALS — HEIGHT: 70 IN | BODY MASS INDEX: 30.1 KG/M2 | WEIGHT: 210.25 LBS | TEMPERATURE: 97.4 F

## 2021-05-14 VITALS — BODY MASS INDEX: 29.71 KG/M2 | HEIGHT: 70 IN | TEMPERATURE: 97.3 F | WEIGHT: 207.5 LBS

## 2021-05-14 VITALS
WEIGHT: 205 LBS | DIASTOLIC BLOOD PRESSURE: 85 MMHG | SYSTOLIC BLOOD PRESSURE: 136 MMHG | HEIGHT: 70 IN | BODY MASS INDEX: 29.35 KG/M2

## 2021-05-14 VITALS — TEMPERATURE: 97.2 F | HEIGHT: 70 IN | BODY MASS INDEX: 29.38 KG/M2 | WEIGHT: 205.25 LBS

## 2021-05-14 VITALS — HEIGHT: 70 IN | WEIGHT: 207 LBS | BODY MASS INDEX: 29.63 KG/M2 | TEMPERATURE: 97.4 F

## 2021-05-14 VITALS — TEMPERATURE: 96.6 F | WEIGHT: 203 LBS | HEIGHT: 70 IN | BODY MASS INDEX: 29.06 KG/M2

## 2021-05-14 NOTE — PROGRESS NOTES
Progress Note      Patient Name: Michael Martin   Patient ID: [id number]   Sex: Male   YOB: 1955    Primary Care Provider: Uri Coughlin MD   Referring Provider: Seng Robles MD    Visit Date: March 16, 2021    Provider: Fernando Phillip MD   Location: [facility]   Location Address: [address] [place] [address], [address]  [id number]   Location Phone: [phone]          Chief Complaint     1.  Infiltrating basal cell carcinoma of the left temple       History Of Present Illness  Michael Martin is a 65 year old /White male who presents to the office today for a follow-up visit.      He presents the clinic today for follow-up regarding recurrent infiltrating basal cell carcinoma of the left temple for which she underwent reexcision and split-thickness skin grafting in October of 2020.  I last saw him for this in December, and he notes that he has been doing well without any significant issues.  He denies any pain at the site.  He has been followed by his dermatologist.  He denies any issues with the graft or surgical site.  There is a small area along the inferior junction between the skin graft and the cheek that the dermatologist had mentioned to him, but I thought this was an impacted gland.       Past Medical History  Cancer of prostate; Facial lesion; Heart Murmur; HTN (hypertension); Skin cancer; Tachycardia         Past Surgical History  Appendectomy; Colonoscopy; lesion removal; Prostate Biopsy; skin cancer removed         Medication List  Atacand 4 mg oral tablet; Crestor 10 mg oral tablet; Lotrel 5-20 mg oral capsule; sildenafil 100 mg oral tablet; Toprol XL 25 mg oral tablet extended release 24 hr         Allergy List  NO KNOWN DRUG ALLERGIES         Family Medical History  No family history of colorectal cancer; Family history of diabetes mellitus         Social History  Alcohol (Current some day); ; Retired; Tobacco (Never)         Review of  "Systems  · Constitutional  o Denies  o : fever, night sweats, weight loss  · Eyes  o Denies  o : discharge from eye, impaired vision  · HENT  o Admits  o : *See HPI  · Cardiovascular  o Denies  o : chest pain, irregular heart beats  · Respiratory  o Denies  o : shortness of breath, wheezing, coughing up blood  · Gastrointestinal  o Denies  o : heartburn, reflux, vomiting blood  · Genitourinary  o Denies  o : frequency  · Integument  o Denies  o : rash, skin dryness  · Neurologic  o Denies  o : seizures, loss of balance, loss of consciousness, dizziness  · Endocrine  o Denies  o : cold intolerance, heat intolerance  · Heme-Lymph  o Denies  o : easy bleeding, anemia      Vitals  Date Time BP Position Site L\R Cuff Size HR RR TEMP (F) WT  HT  BMI kg/m2 BSA m2 O2 Sat FR L/min FiO2 HC       03/16/2021 01:47 PM        97.3 207lbs 8oz 5'  10.5\" 29.35 2.16             Physical Examination  · Constitutional  o Appearance  o : well developed, well-nourished, alert and in no acute distress, voice clear and strong  · Head and Face  o Head  o :   § Inspection  § : no deformities or lesions  o Face  o :   § Inspection  § : Left facial skin graft with 100% take, microscopic evaluation of the graft reveals a comedome along the area of concern inferiorly; House-Brackmann I/VI bilaterally  § Palpation  § : No TMJ crepitus nor  muscle tenderness bilaterally  · Eyes  o Vision  o :   § Visual Fields  § : Extraocular movements are intact. No spontaneous or gaze-induced nystagmus.  o Conjunctivae  o : clear  o Sclerae  o : clear  o Pupils and Irises  o : pupils equal, round, and reactive to light.   · Ears, Nose, Mouth and Throat  o Ears  o :   § External Ears  § : appearance within normal limits, no lesions present  § Otoscopic Examination  § : tympanic membrane appearance within normal limits bilaterally without perforations, well-aerated middle ears  § Hearing  § : intact to conversational voice both ears  o Nose  o : "   § External Nose  § : appearance normal  § Intranasal Exam  § : mucosa within normal limits, vestibules normal, no intranasal lesions present, septum midline, sinuses non tender to percussion  o Oral Cavity  o :   § Oral Mucosa  § : oral mucosa normal without pallor or cyanosis  § Lips  § : lip appearance normal  § Teeth  § : normal dentition for age  § Gums  § : gums pink, non-swollen, no bleeding present  § Tongue  § : tongue appearance normal; normal mobility  § Palate  § : hard palate normal, soft palate appearance normal with symmetric mobility  o Throat  o :   § Oropharynx  § : no inflammation or lesions present, tonsils within normal limits  § Hypopharynx  § : appearance within normal limits, superior epiglottis within normal limits  § Larynx  § : appearance within normal limits, vocal cords within normal limits, no lesions present  · Neck  o Inspection/Palpation  o : normal appearance, no masses or tenderness, trachea midline; thyroid size normal, nontender, no nodules or masses present on palpation  · Respiratory  o Respiratory Effort  o : breathing unlabored  o Inspection of Chest  o : normal appearance, no retractions  · Cardiovascular  o Heart  o : regular rate and rhythm  · Lymphatic  o Neck  o : no lymphadenopathy present  o Supraclavicular Nodes  o : no lymphadenopathy present  o Preauricular Nodes  o : no lymphadenopathy present  · Skin and Subcutaneous Tissue  o General Inspection  o : Regarding face and neck - there are no rashes present, no lesions present, and no areas of discoloration  · Neurologic  o Cranial Nerves  o : cranial nerves II-XII are grossly intact bilaterally  o Gait and Station  o : normal gait, able to stand without diffculty  · Psychiatric  o Judgement and Insight  o : judgment and insight intact  o Mood and Affect  o : mood normal, affect appropriate          Assessment  · Basal cell carcinoma     173.91/C44.91      Plan  · Orders  o Microscopic exam [facility] ([id number]) -  173.91/C44.91 - 03/29/2021  · Medications  o Medications have been Reconciled  o Transition of Care or Provider Policy  · Instructions  o He presents the clinic today for follow-up regarding recurrent infiltrating basal cell carcinoma of the left temple for which she underwent reexcision and split-thickness skin grafting in October of 2020. I last saw him for this in December, and he notes that he has been doing well without any significant issues. He denies any pain at the site. He has been followed by his dermatologist. He denies any issues with the graft or surgical site. There is a small area along the inferior junction between the skin graft and the cheek that the dermatologist had mentioned to him, but I thought this was an impacted gland. Examination revealed a well healed skin graft with no evidence of recurrence. There is a small area which was assessed under the microscope and appears to be a comedome. I will have him keep an eye on this and let me know if there are any changes. If it is stable I will see him back in the clinic in December.  o Electronically Identified Patient Education Materials Provided Electronically            Electronically Signed by: Fernando Phillip MD -Author on March 29, 2021 03:10:33 PM

## 2021-05-15 VITALS
SYSTOLIC BLOOD PRESSURE: 148 MMHG | HEART RATE: 76 BPM | BODY MASS INDEX: 29.2 KG/M2 | DIASTOLIC BLOOD PRESSURE: 96 MMHG | HEIGHT: 70 IN | WEIGHT: 204 LBS

## 2021-05-15 VITALS — RESPIRATION RATE: 16 BRPM | HEIGHT: 71 IN | BODY MASS INDEX: 28.42 KG/M2 | WEIGHT: 203 LBS

## 2021-05-15 VITALS — BODY MASS INDEX: 28.42 KG/M2 | RESPIRATION RATE: 16 BRPM | HEIGHT: 71 IN | WEIGHT: 203 LBS

## 2021-05-15 VITALS — WEIGHT: 203 LBS | BODY MASS INDEX: 28.42 KG/M2 | HEIGHT: 71 IN | RESPIRATION RATE: 16 BRPM

## 2021-05-16 VITALS — BODY MASS INDEX: 28.42 KG/M2 | RESPIRATION RATE: 16 BRPM | WEIGHT: 203 LBS | HEIGHT: 71 IN

## 2021-05-16 VITALS — HEIGHT: 70 IN | BODY MASS INDEX: 29.06 KG/M2 | RESPIRATION RATE: 16 BRPM | WEIGHT: 203 LBS

## 2021-06-07 RX ORDER — CANDESARTAN 32 MG/1
32 TABLET ORAL DAILY
Qty: 90 TABLET | Refills: 1 | Status: SHIPPED | OUTPATIENT
Start: 2021-06-07

## 2021-06-07 RX ORDER — AMLODIPINE BESYLATE AND BENAZEPRIL HYDROCHLORIDE 5; 20 MG/1; MG/1
1 CAPSULE ORAL DAILY
Qty: 90 CAPSULE | Refills: 1 | Status: SHIPPED | OUTPATIENT
Start: 2021-06-07

## 2021-06-07 RX ORDER — AMLODIPINE BESYLATE AND BENAZEPRIL HYDROCHLORIDE 5; 20 MG/1; MG/1
CAPSULE ORAL
COMMUNITY
End: 2021-06-07 | Stop reason: SDUPTHER

## 2021-06-07 RX ORDER — CANDESARTAN 32 MG/1
TABLET ORAL
COMMUNITY
Start: 2021-04-23 | End: 2021-06-07 | Stop reason: SDUPTHER

## 2021-06-07 RX ORDER — METOPROLOL SUCCINATE 25 MG/1
25 TABLET, EXTENDED RELEASE ORAL DAILY
COMMUNITY
End: 2021-06-07 | Stop reason: SDUPTHER

## 2021-06-07 RX ORDER — METOPROLOL SUCCINATE 25 MG/1
25 TABLET, EXTENDED RELEASE ORAL DAILY
Qty: 90 TABLET | Refills: 1 | Status: SHIPPED | OUTPATIENT
Start: 2021-06-07 | End: 2021-06-08 | Stop reason: SDUPTHER

## 2021-06-08 DIAGNOSIS — R00.2 PALPITATIONS: Primary | ICD-10-CM

## 2021-06-08 RX ORDER — METOPROLOL SUCCINATE 25 MG/1
25 TABLET, EXTENDED RELEASE ORAL DAILY
Qty: 14 TABLET | Refills: 0 | Status: SHIPPED | OUTPATIENT
Start: 2021-06-08 | End: 2021-06-09 | Stop reason: SDUPTHER

## 2021-06-09 DIAGNOSIS — R00.2 PALPITATIONS: ICD-10-CM

## 2021-06-09 RX ORDER — METOPROLOL SUCCINATE 25 MG/1
25 TABLET, EXTENDED RELEASE ORAL DAILY
Qty: 14 TABLET | Refills: 0 | Status: SHIPPED | OUTPATIENT
Start: 2021-06-09 | End: 2021-06-10 | Stop reason: SDUPTHER

## 2021-06-09 RX ORDER — METOPROLOL SUCCINATE 25 MG/1
25 TABLET, EXTENDED RELEASE ORAL DAILY
Qty: 14 TABLET | Refills: 0 | Status: SHIPPED | OUTPATIENT
Start: 2021-06-09 | End: 2021-06-09 | Stop reason: SDUPTHER

## 2021-06-10 DIAGNOSIS — R00.2 PALPITATIONS: ICD-10-CM

## 2021-06-10 RX ORDER — METOPROLOL SUCCINATE 25 MG/1
25 TABLET, EXTENDED RELEASE ORAL DAILY
Qty: 90 TABLET | Refills: 1 | Status: SHIPPED | OUTPATIENT
Start: 2021-06-10 | End: 2021-12-09

## 2021-07-17 PROBLEM — R00.2 PALPITATIONS: Status: ACTIVE | Noted: 2021-07-17

## 2021-07-17 PROBLEM — E78.2 HYPERLIPEMIA, MIXED: Status: ACTIVE | Noted: 2021-07-17

## 2021-07-17 PROBLEM — I10 HYPERTENSION, ESSENTIAL: Status: ACTIVE | Noted: 2021-07-17

## 2021-07-17 NOTE — PROGRESS NOTES
[facility]  Cardiology progress Note    Patient Name: Michael Martin  : 1955   Chief Complaint      Palpitations.  Hypertension.           Subjective   Subjective       HPI:  Michael Martin is a 66 y.o. male with history of hypertension and palpitations.  No further palpitations.  Blood pressure well controlled at home.    Review of Systems:   Constitutional no fever,  no weight loss   Skin no rash   Otolaryngeal no difficulty swallowing   Cardiovascular See HPI   Pulmonary no cough, no sputum production   Gastrointestinal no constipation, no diarrhea   Genitourinary no dysuria, no hematuria   Hematologic no easy bruisability, no abnormal bleeding   Musculoskeletal no muscle pain   Neurologic no dizziness, no falls         Personal History     Social History:  reports that he has never smoked. He has never used smokeless tobacco. He reports current alcohol use. He reports that he does not use drugs.    Home Medications:  Current Outpatient Medications on File Prior to Visit   Medication Sig   • amLODIPine-benazepril (Lotrel) 5-20 MG per capsule Take 1 capsule by mouth Daily.   • candesartan (ATACAND) 32 MG tablet Take 1 tablet by mouth Daily.   • metoprolol succinate XL (TOPROL-XL) 25 MG 24 hr tablet Take 1 tablet by mouth Daily.   • rosuvastatin (CRESTOR) 5 MG tablet Take 5 mg by mouth Daily.   • sildenafil (VIAGRA) 100 MG tablet As Needed.     No current facility-administered medications on file prior to visit.     Allergies:  No Known Allergies    Objective    Objective       Vitals:   Heart Rate:  [58] 58  BP: (147-149)/(92-93) 149/93  Body mass index is 29.13 kg/m².     Physical Exam:   Constitutional: Awake, alert, No acute distress    Eyes: PERRLA, sclerae anicteric, no conjunctival injection   HENT: NCAT, mucous membranes moist   Neck: Supple, no thyromegaly, no lymphadenopathy, trachea midline   Respiratory: Clear to auscultation bilaterally, nonlabored respirations     Cardiovascular: RRR, no murmurs, rubs, or gallops, palpable pedal pulses bilaterally   Gastrointestinal: Positive bowel sounds, soft, nontender, nondistended   Musculoskeletal: No bilateral ankle edema, no clubbing or cyanosis to extremities   Psychiatric: Appropriate affect, cooperative   Neurologic: Oriented x 3, strength symmetric in all extremities, Cranial Nerves grossly intact to confrontation, speech clear   Skin: No rashes.    Result Review    Result Review:  I have personally reviewed the available results from  [x]  Laboratory  [x]  EKG  [x]  Cardiology  [x]  Medications  [x]  Old records  []  Other:   Procedures    Impression/Plan:  1.  Stable palpitations/paroxysmal SVT.  Continue metoprolol.  2.  Essential hypertension, controlled.  Continue Atacand and Lotrel.  Monitor blood pressure regularly.  Be on a low-salt diet.  Blood pressure is slightly high.  If persistently high readjust antihypertensive medication.  3.  Hyperlipidemia: Continue current dose of Crestor.  Low-fat diet advised.

## 2021-07-20 ENCOUNTER — OFFICE VISIT (OUTPATIENT)
Dept: CARDIOLOGY | Facility: CLINIC | Age: 66
End: 2021-07-20

## 2021-07-20 VITALS
WEIGHT: 203 LBS | DIASTOLIC BLOOD PRESSURE: 93 MMHG | SYSTOLIC BLOOD PRESSURE: 149 MMHG | HEART RATE: 58 BPM | HEIGHT: 70 IN | BODY MASS INDEX: 29.06 KG/M2

## 2021-07-20 DIAGNOSIS — E78.2 HYPERLIPEMIA, MIXED: ICD-10-CM

## 2021-07-20 DIAGNOSIS — R00.2 PALPITATIONS: Primary | ICD-10-CM

## 2021-07-20 DIAGNOSIS — I10 HYPERTENSION, ESSENTIAL: ICD-10-CM

## 2021-07-20 PROCEDURE — 99213 OFFICE O/P EST LOW 20 MIN: CPT | Performed by: SPECIALIST

## 2021-07-20 RX ORDER — SILDENAFIL 100 MG/1
TABLET, FILM COATED ORAL AS NEEDED
COMMUNITY
Start: 2021-05-20

## 2021-07-20 RX ORDER — ROSUVASTATIN CALCIUM 5 MG/1
5 TABLET, COATED ORAL DAILY
COMMUNITY
Start: 2021-04-23

## 2021-10-01 DIAGNOSIS — C61 PROSTATE CANCER (HCC): Primary | ICD-10-CM

## 2021-11-01 DIAGNOSIS — C61 PROSTATE CANCER (HCC): ICD-10-CM

## 2021-11-16 DIAGNOSIS — R00.2 PALPITATIONS: ICD-10-CM

## 2021-11-18 ENCOUNTER — LAB (OUTPATIENT)
Dept: LAB | Facility: HOSPITAL | Age: 66
End: 2021-11-18

## 2021-11-18 DIAGNOSIS — C61 PROSTATE CANCER (HCC): ICD-10-CM

## 2021-11-18 LAB — PSA SERPL-MCNC: 9.29 NG/ML (ref 0–4)

## 2021-11-18 PROCEDURE — 84153 ASSAY OF PSA TOTAL: CPT

## 2021-11-18 PROCEDURE — 36415 COLL VENOUS BLD VENIPUNCTURE: CPT

## 2021-11-22 ENCOUNTER — OFFICE VISIT (OUTPATIENT)
Dept: UROLOGY | Facility: CLINIC | Age: 66
End: 2021-11-22

## 2021-11-22 VITALS
SYSTOLIC BLOOD PRESSURE: 149 MMHG | DIASTOLIC BLOOD PRESSURE: 90 MMHG | BODY MASS INDEX: 29.63 KG/M2 | HEIGHT: 70 IN | WEIGHT: 207 LBS

## 2021-11-22 DIAGNOSIS — C61 PROSTATE CANCER (HCC): Primary | ICD-10-CM

## 2021-11-22 LAB
BILIRUB BLD-MCNC: NEGATIVE MG/DL
CLARITY, POC: CLEAR
COLOR UR: YELLOW
EXPIRATION DATE: ABNORMAL
GLUCOSE UR STRIP-MCNC: NEGATIVE MG/DL
KETONES UR QL: NEGATIVE
LEUKOCYTE EST, POC: ABNORMAL
Lab: ABNORMAL
NITRITE UR-MCNC: NEGATIVE MG/ML
PH UR: 6 [PH] (ref 5–8)
PROT UR STRIP-MCNC: NEGATIVE MG/DL
RBC # UR STRIP: ABNORMAL /UL
SP GR UR: 1.02 (ref 1–1.03)
UROBILINOGEN UR QL: NORMAL

## 2021-11-22 PROCEDURE — 99213 OFFICE O/P EST LOW 20 MIN: CPT | Performed by: UROLOGY

## 2021-11-22 PROCEDURE — 81003 URINALYSIS AUTO W/O SCOPE: CPT | Performed by: UROLOGY

## 2021-11-22 NOTE — PROGRESS NOTES
Chief Complaint  Follow-up (Annual Exam PSA and MRI)    Subjective          Verbal consent obtained for recording.    Michael Martin presents to [facility]  History of Present Illness    Mr. Martin presents today for follow-up for elevated PSA. Last year it was 10.36. He is a 66-year-old man on active surveillance for prostate cancer. His PSA now is currently 9 and had a repeat prostate MRI which showed benign prostatic hyperplasia and chronic prostatitis. But no lesions concerning for significant prostate cancer were seen.      63yo male referred by Dr. Mitchell for prostate cancer. He is on AS. He was diagnosed in Dec 2015. PSA at that time was 7.89. TRUS bx showed [place] 3+3 in 2 cores in left sided hypoechoic area.   The patient had repeat PSA in 7/16. At that time, it was 9.8. The patient had repeat TRUS bx and this showed [place] 3+3 in 1 core on the right side. The patient then had a repeat PSA in April 2017 and PSA increased to 17.4. He presents for eval. We discussed his PSA. He denies any new urinary symptoms. He is not on any meds for his prostate.   The patient does not know if there is a family history of prostate cancer in his family.    Patient now presents after having MRI of the prostate. There were no suspicious T2 localizing lesions in the prostate gland. The patnient did have an enlarged gland of 68gm and there was some chronic prostatitis. The patient and I reviewed this study. He would like to remain on AS. I explained that I prefer for patients to have PSA less than 10 to be on AS. He understands that he would be stretching the boundaries for AS.    The patient now presents after having prostate biopsy. He has done well from the biopsy. There was no cancer seen on the biopsy. The patient and I reviewed this. He would like to continue on AS.    The patient now presents in follow up. He is doing well. He denies any new urinary issues. The patient had a repeat PSA. His  PSA in April was 17 and it is now down to 14. We reviewed this. The patient would like to continue on AS. We discussed the need for an annual biopsy while on AS. He is aware of this.    6/11/18 - 61yo male presents in follow up. He is doing well. He had a recent PSA and this has gone down to 11.9 from 14. We discussed this. I would like to get an MRI of the prostate and see if there are any suspicious lesions in the prostate. From there, I would see him back. If that study is negative for suspicious lesions then I would hold on a biopsy. If there is a concerning lesion, I would do a fusion biopsy.    7/3/18 - Patient presents in follow up. The patient had an MRI of the prostate. This showed a volume of 71ml. There was no suspicious T2 signal within the peripheral zone favored to reflect chronic inflammation. There was no focal abnormality suspicious for clinically significant prostate cancer. We discussed this. I discussed holding on a biopsy this year given that his PSA is less and there were no lesions on the MRI. I would like to see him back in 1 year with a PSA. We discussed that typically a TRUS bx is recommended annually for AS patients, however, with his negative MRI. I would like to omit that. I think that next year, the patient will possibly need a biopsy.    7/8/19 - Patient presents in follow up. He is doing well. He denies any urinary issues. The patient had a recent PSA and this has increased slightly to 12.8. We discussed this. His last biopsy was in 2017 and was negative. His MRI was negative for localizing lesions in the prostate to suggest clinically significant prostate cancer. We discussed repeating this study and possibly doing a biopsy after as he has not had one in 2 years. He is agreeable to this.    8/2/19 - Patient presents in follow up. He had an MRI of the prostate. This showed a volume of 81gm. There were no localizing lesions to suggest significant prostate cancer. We discussed that. I  "discussed that the MRI is not always accurate. I discussed that it has been 2 years since last biopsy and his PSA is rising. I would feel better to do a TRUS bx of the prostate. He is in agreement with this. I discussed pain, bleeding, infection, ED as risks. He would like to proceed.    9/3/19 - Patient presents in follow up. He is doing well after his TRUS bx of the prostate. His path showed aytpia and inflammation, but no cancer. We discussed this. I have recommended observation. We discussed the possibility of a false negative and understaging.    10/29/20 - His most recent PSA is 10.36.  His prostate MRI last year was negative.  His biopsy was negative.  We will repeat his prostate MRI given he is on active surveillance.    11/22/2021-  Mr. Martin states that he is doing good. He denies that he has anything else going on.  His most recent PSA is 9.  His prostate MRI was negative for lesions concerning for prostate cancer.     Objective   Vital Signs:   /90   Ht 177.8 cm (70\")   Wt 93.9 kg (207 lb)   BMI 29.70 kg/m²     Physical Exam  Vitals and nursing note reviewed.   Constitutional:       Appearance: Normal appearance. He is well-developed.   Pulmonary:      Effort: Pulmonary effort is normal.      Breath sounds: Normal air entry.   Neurological:      Mental Status: He is alert and oriented to person, place, and time.      Motor: Motor function is intact.   Psychiatric:         Mood and Affect: Mood normal.         Behavior: Behavior normal.        Result Review :            MRI Prostate With & Without Contrast (1955)            Results for orders placed or performed in visit on 11/22/21   POC Urinalysis Dipstick, Automated    Specimen: Urine   Result Value Ref Range    Color Yellow Yellow, Straw, Dark Yellow, Rosalind    Clarity, UA Clear Clear    Specific Gravity  1.025 1.005 - 1.030    pH, Urine 6.0 5.0 - 8.0    Leukocytes Trace (A) Negative    Nitrite, UA Negative Negative    Protein, POC " Negative Negative mg/dL    Glucose, UA Negative Negative, 1000 mg/dL (3+) mg/dL    Ketones, UA Negative Negative    Urobilinogen, UA Normal Normal    Bilirubin Negative Negative    Blood, UA Trace (A) Negative    Lot Number [id number]     Expiration Date 11/30/2022        Assessment and Plan    Diagnoses and all orders for this visit:    1. Prostate cancer (HCC) (Primary)  -     POC Urinalysis Dipstick, Automated  -     MRI Prostate With & Without Contrast; Future  -     PSA DIAGNOSTIC; Future      Prostate cancer  - We will repeat PSA and MRI in 1 year.  - He will follow up in 1 year, otherwise I will see him sooner if he needs me.      Follow Up   No follow-ups on file.  Patient was given instructions and counseling regarding his condition or for health maintenance advice. Please see specific information pulled into the AVS if appropriate.     Transcribed from ambient dictation for Megan Whiteside MD by Marjorie Miles.  11/22/21   12:57 EST    Patient verbalized consent to the visit recording.  I have personally performed the services described in this document as transcribed by the above individual, and it is both accurate and complete.  Megan Whiteside MD  11/22/2021  13:28 EST

## 2021-12-02 ENCOUNTER — OFFICE VISIT (OUTPATIENT)
Dept: OTOLARYNGOLOGY | Facility: CLINIC | Age: 66
End: 2021-12-02

## 2021-12-02 VITALS — HEIGHT: 70 IN | BODY MASS INDEX: 30.01 KG/M2 | TEMPERATURE: 97.2 F | WEIGHT: 209.6 LBS

## 2021-12-02 DIAGNOSIS — C44.310 BASAL CELL CARCINOMA, FACE: Primary | ICD-10-CM

## 2021-12-02 PROCEDURE — 99213 OFFICE O/P EST LOW 20 MIN: CPT | Performed by: OTOLARYNGOLOGY

## 2021-12-02 NOTE — PROGRESS NOTES
Patient Name: Michael Martin   Visit Date: 12/02/2021   Patient ID: [id number]  Provider: Fernando Phillip MD    Sex: male  Location: [facility]   YOB: 1955  Location Address: [address] [place] [address], [address] [place] [address],?[place]?[id number]    Primary Care Provider Uri Coughlin MD  Location Phone: [phone]    Referring Provider: No ref. provider found        Chief Complaint  Follow-up (9 month f/u CA left temple )    Subjective    History of Present Illness  Michael Martin is a 66 y.o. male who presents to [facility] today as a consult from No ref. provider found.    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  He has been doing well, and has not had any significant issues with the surgical site.  He denies any pain in this area.  He is seeing a plastic surgeon in [place] for fat grafting to the site.  He continues to be under the care of his dermatologist who sees him every 3 to 6 months.    Past Medical History:   Diagnosis Date   • Cancer of prostate (HCC)    • Colon polyps    • Facial lesion    • Heart murmur    • Hyperlipidemia    • Hypertension    • Skin cancer    • Tachycardia        Past Surgical History:   Procedure Laterality Date   • ADENOIDECTOMY  April 1987   • APPENDECTOMY     • COLONOSCOPY  08/23/2016    TREMAINE HI, H/O COLON POLYPS   • COSMETIC SURGERY  Oct 2021   • EXCISION LESION     • PROSTATE BIOPSY  2007    NEG   • SKIN CANCER EXCISION           Current Outpatient Medications:   •  amLODIPine-benazepril (Lotrel) 5-20 MG per capsule, Take 1 capsule by mouth Daily., Disp: 90 capsule, Rfl: 1  •  candesartan (ATACAND) 32 MG tablet, Take 1 tablet by mouth Daily., Disp: 90 tablet, Rfl: 1  •  metoprolol succinate XL (TOPROL-XL) 25 MG 24 hr tablet, Take 1 tablet by  "mouth Daily., Disp: 90 tablet, Rfl: 1  •  rosuvastatin (CRESTOR) 5 MG tablet, Take 5 mg by mouth Daily., Disp: , Rfl:   •  sildenafil (VIAGRA) 100 MG tablet, As Needed., Disp: , Rfl:      No Known Allergies    Family History   Problem Relation Age of Onset   • Diabetes Mother         Social History     Social History Narrative   • Not on file       Objective     Vital Signs:   Temp 97.2 °F (36.2 °C) (Temporal)   Ht 177.8 cm (70\")   Wt 95.1 kg (209 lb 9.6 oz)   BMI 30.07 kg/m²       Physical Exam         Constitutional   Appearance  · : well developed, well-nourished, alert and in no acute distress, voice clear and strong    Head  Inspection  · : no deformities or lesions  Face  Inspection  · : No facial lesions.  Well-healed split-thickness skin graft with no palpable tumor or visible disease; House-Brackmann I/VI bilaterally  Palpation  · : No TMJ crepitus nor  muscle tenderness bilaterally    Eyes  Vision  Visual Fields  · : Extraocular movements are intact. No spontaneous or gaze-induced nystagmus.  Conjunctivae  · : clear  Sclerae  · : clear  Pupils and Irises  · : pupils equal, round, and reactive to light.     Ears, Nose, Mouth and Throat    Ears    External Ears  · : appearance within normal limits, no lesions present  Otoscopic Examination  · : Tympanic membrane appearance within normal limits bilaterally without perforations, well-aerated middle ears  Hearing  · : intact to conversational voice both ears  Tunning fork testing:     :    Nose    External Nose  · : appearance normal  Intranasal Exam  · : mucosa within normal limits, vestibules normal, no intranasal lesions present, septum midline, sinuses non tender to percussion  Oral Cavity    Oral Mucosa  · : oral mucosa normal without pallor or cyanosis  Lips  · : lip appearance normal  Teeth  · : normal dentition for age  Gums  · : gums pink, non-swollen, no bleeding present  Tongue  · : tongue appearance normal; normal mobility  Palate  · : " hard palate normal, soft palate appearance normal with symmetric mobility    Throat    Oropharynx  · : no inflammation or lesions present, tonsils within normal limits  Hypopharynx  · : appearance within normal limits, superior epiglottis within normal limits  Larynx  · : appearance within normal limits, vocal cords within normal limits, no lesions present    Neck  Inspection/Palpation  · : normal appearance, no masses or tenderness, trachea midline; thyroid size normal, nontender, no nodules or masses present on palpation    Respiratory  Respiratory Effort  · : breathing unlabored  Inspection of Chest  · : normal appearance, no retractions    Cardiovascular  Heart  · : regular rate and rhythm    Lymphatic  Neck  · : no lymphadenopathy present  Supraclavicular Nodes  · : no lymphadenopathy present  Preauricular Nodes  · : no lymphadenopathy present    Skin and Subcutaneous Tissue  General Inspection  · : Regarding face and neck - there are no rashes present, no lesions present, and no areas of discoloration    Neurologic  Cranial Nerves  · : cranial nerves II-XII are grossly intact bilaterally  Gait and Station  · : normal gait, able to stand without diffculty    Psychiatric  Judgement and Insight  · : judgment and insight intact  Mood and Affect  · : mood normal, affect appropriate          Assessment and Plan    Diagnoses and all orders for this visit:    1. Basal cell carcinoma, face (Primary)    Examination today revealed well-healed split-thickness skin graft with no evidence of palpable tumor or visible disease.  Since he is followed by his dermatologist, I gave him the option of continuing to follow with me or to just be checked by his dermatologist.  He prefers to have his dermatologist check the area since he is frequently seen there.  He understands that I will be glad to see him should there be any issues.    Follow Up   No follow-ups on file.  Patient was given instructions and counseling regarding his  condition or for health maintenance advice. Please see specific information pulled into the AVS if appropriate.

## 2021-12-09 RX ORDER — METOPROLOL SUCCINATE 25 MG/1
TABLET, EXTENDED RELEASE ORAL
Qty: 90 TABLET | Refills: 3 | Status: SHIPPED | OUTPATIENT
Start: 2021-12-09 | End: 2022-10-13

## 2022-03-07 ENCOUNTER — OFFICE VISIT (OUTPATIENT)
Dept: CARDIOLOGY | Facility: CLINIC | Age: 67
End: 2022-03-07

## 2022-03-07 VITALS
WEIGHT: 206 LBS | BODY MASS INDEX: 29.49 KG/M2 | HEART RATE: 65 BPM | SYSTOLIC BLOOD PRESSURE: 135 MMHG | DIASTOLIC BLOOD PRESSURE: 84 MMHG | HEIGHT: 70 IN

## 2022-03-07 DIAGNOSIS — I10 HYPERTENSION, ESSENTIAL: ICD-10-CM

## 2022-03-07 DIAGNOSIS — E78.2 HYPERLIPEMIA, MIXED: ICD-10-CM

## 2022-03-07 DIAGNOSIS — I47.1 PAROXYSMAL SVT (SUPRAVENTRICULAR TACHYCARDIA): Primary | ICD-10-CM

## 2022-03-07 PROBLEM — N52.9 MALE ERECTILE DISORDER: Status: ACTIVE | Noted: 2021-10-22

## 2022-03-07 PROBLEM — C61 MALIGNANT NEOPLASM OF PROSTATE (HCC): Status: ACTIVE | Noted: 2021-10-22

## 2022-03-07 PROBLEM — C44.90 SKIN CANCER: Status: ACTIVE | Noted: 2022-03-07

## 2022-03-07 PROBLEM — D12.6 BENIGN NEOPLASM OF COLON: Status: ACTIVE | Noted: 2021-10-22

## 2022-03-07 PROBLEM — I47.10 PAROXYSMAL SVT (SUPRAVENTRICULAR TACHYCARDIA): Status: ACTIVE | Noted: 2021-07-17

## 2022-03-07 PROCEDURE — 93000 ELECTROCARDIOGRAM COMPLETE: CPT | Performed by: NURSE PRACTITIONER

## 2022-03-07 PROCEDURE — 99213 OFFICE O/P EST LOW 20 MIN: CPT | Performed by: NURSE PRACTITIONER

## 2022-03-07 NOTE — EXTERNAL PATIENT INSTRUCTIONS
Patient Education   Table of Contents       Dyslipidemia       Managing Your Hypertension       Supraventricular Tachycardia, Adult     To view videos and all your education online visit,   [email or web address]   or scan this QR code with your smartphone.                  Dyslipidemia     Dyslipidemia is an imbalance of waxy, fat-like substances (lipids) in the blood. The body needs lipids in small amounts. Dyslipidemia often involves a high level of cholesterol or triglycerides, which are types of lipids.    Common forms of dyslipidemia include:       High levels of LDL cholesterol. LDL is the type of cholesterol that causes fatty deposits (plaques) to build up in the blood vessels that carry blood away from your heart (arteries).       Low levels of HDL cholesterol. HDL cholesterol is the type of cholesterol that protects against heart disease. High levels of HDL remove the LDL buildup from arteries.       High levels of triglycerides. Triglycerides are a fatty substance in the blood that is linked to a buildup of plaques in the arteries.     What are the causes?   Primary dyslipidemia is caused by changes (mutations) in genes that are passed down through families (inherited). These mutations cause several types of dyslipidemia.    Secondary dyslipidemia is caused by lifestyle choices and diseases that lead to dyslipidemia, such as:       Eating a diet that is high in animal fat.       Not getting enough exercise.       Having diabetes, kidney disease, liver disease, or thyroid disease.       Drinking large amounts of alcohol.       Using certain medicines.     What increases the risk?    You are more likely to develop this condition if you are an older man or if you are a woman who has gone through menopause. Other risk factors include:       Having a family history of dyslipidemia.       Taking certain medicines, including birth control pills, steroids, some diuretics, and beta-blockers.       Smoking  cigarettes.       Eating a high-fat diet.       Having certain medical conditions such as diabetes, polycystic ovary syndrome (PCOS), kidney disease, liver disease, or hypothyroidism.       Not exercising regularly.       Being overweight or obese with too much belly fat.     What are the signs or symptoms?   In most cases, dyslipidemia does not usually cause any symptoms.    In severe cases, very high lipid levels can cause:       Fatty bumps under the skin (xanthomas).       White or gray ring around the black center (pupil) of the eye.     Very high triglyceride levels can cause inflammation of the pancreas (pancreatitis).   How is this diagnosed?       Your health care provider may diagnose dyslipidemia based on a routine blood test (fasting blood test). Because most people do not have symptoms of the condition, this blood testing (lipid profile) is done on adults age 20 and older and is repeated every 5 years. This test checks:       Total cholesterol. This measures the total amount of cholesterol in your blood, including LDL cholesterol, HDL cholesterol, and triglycerides. A healthy number is below 200.       LDL cholesterol. The target number for LDL cholesterol is different for each person, depending on individual risk factors. Ask your health care provider what your LDL cholesterol should be.       HDL cholesterol. An HDL level of 60 or higher is best because it helps to protect against heart disease. A number below 40 for men or below 50 for women increases the risk for heart disease.       Triglycerides. A healthy triglyceride number is below 150.     If your lipid profile is abnormal, your health care provider may do other blood tests.     How is this treated?   Treatment depends on the type of dyslipidemia that you have and your other risk factors for heart disease and stroke. Your health care provider will have a target range for your lipid levels based on this information.    For many people, this  condition may be treated by lifestyle changes, such as diet and exercise. Your health care provider may recommend that you:       Get regular exercise.       Make changes to your diet.       Quit smoking if you smoke.     If diet changes and exercise do not help you reach your goals, your health care provider may also prescribe medicine to lower lipids. The most commonly prescribed type of medicine lowers your LDL cholesterol (statin drug). If you have a high triglyceride level, your provider may prescribe another type of drug (fibrate) or an omega-3 fish oil supplement, or both.   Follow these instructions at home:      Eating and drinking         Follow instructions from your health care provider or dietitian about eating or drinking restrictions.      Eat a healthy diet as told by your health care provider. This can help you reach and maintain a healthy weight, lower your LDL cholesterol, and raise your HDL cholesterol. This may include:       Limiting your calories, if you are overweight.       Eating more fruits, vegetables, whole grains, fish, and lean meats.       Limiting saturated fat, trans fat, and cholesterol.      If you drink alcohol:       Limit how much you use.       Be aware of how much alcohol is in your drink. In the [place] one drink equals one 12 oz bottle of beer (355 mL), one 5 oz glass of wine (148 mL), or one 1? oz glass of hard liquor (44 mL).      Do not  drink alcohol if:       Your health care provider tells you not to drink.       You are pregnant, may be pregnant, or are planning to become pregnant.     Activity        Get regular exercise. Start an exercise and strength training program as told by your health care provider. Ask your health care provider what activities are safe for you. Your health care provider may recommend:       30 minutes of aerobic activity 4?6 days a week. Brisk walking is an example of aerobic activity.       Strength training 2 days a week.     General  instructions        Do not  use any products that contain nicotine or tobacco, such as cigarettes, e-cigarettes, and chewing tobacco. If you need help quitting, ask your health care provider.       Take over-the-counter and prescription medicines only as told by your health care provider. This includes supplements.       Keep all follow-up visits as told by your health care provider.       Contact a health care provider if:        You are:       Having trouble sticking to your exercise or diet plan.       Struggling to quit smoking or control your use of alcohol.     Summary         Dyslipidemia often involves a high level of cholesterol or triglycerides, which are types of lipids.       Treatment depends on the type of dyslipidemia that you have and your other risk factors for heart disease and stroke.       For many people, treatment starts with lifestyle changes, such as diet and exercise.       Your health care provider may prescribe medicine to lower lipids.     This information is not intended to replace advice given to you by your health care provider. Make sure you discuss any questions you have with your health care provider.     Document Released: 12/23/2014Document Revised: 08/12/2019Document Reviewed: 07/19/2019     [employer] Patient Education ? 2021 [employer]         Managing Your Hypertension     Hypertension, also called high blood pressure, is when the force of the blood pressing against the walls of the arteries is too strong. Arteries are blood vessels that carry blood from your heart throughout your body. Hypertension forces the heart to work harder to pump blood and may cause the arteries to become narrow or stiff.   Understanding blood pressure readings    Your personal target blood pressure may vary depending on your medical conditions, your age, and other factors. A blood pressure reading includes a higher number over a lower number. Ideally, your blood pressure should be below 120/80. You  should know that:       The first, or top, number is called the systolic pressure. It is a measure of the pressure in your arteries as your heart beats.       The second, or bottom number, is called the diastolic pressure. It is a measure of the pressure in your arteries as the heart relaxes.     Blood pressure is classified into four stages. Based on your blood pressure reading, your health care provider may use the following stages to determine what type of treatment you need, if any. Systolic pressure and diastolic pressure are measured in a unit called mmHg.   Normal         Systolic pressure: below 120.       Diastolic pressure: below 80.     Elevated         Systolic pressure: 120?129.       Diastolic pressure: below 80.     Hypertension stage 1         Systolic pressure: 130?139.       Diastolic pressure: 80?89.     Hypertension stage 2         Systolic pressure: 140 or above.       Diastolic pressure: 90 or above.     How can this condition affect me?    Managing your hypertension is an important responsibility. Over time, hypertension can damage the arteries and decrease blood flow to important parts of the body, including the brain, heart, and kidneys. Having untreated or uncontrolled hypertension can lead to:       A heart attack.       A stroke.       A weakened blood vessel (aneurysm).       Heart failure.       Kidney damage.       Eye damage.       Metabolic syndrome.       Memory and concentration problems.       Vascular dementia.     What actions can I take to manage this condition?   Hypertension can be managed by making lifestyle changes and possibly by taking medicines. Your health care provider will help you make a plan to bring your blood pressure within a normal range.   Nutrition           Eat a diet that is high in fiber and potassium, and low in salt (sodium), added sugar, and fat. An example eating plan is called the Dietary Approaches to Stop Hypertension (DASH) diet. To eat this way:        Eat plenty of fresh fruits and vegetables. Try to fill one-half of your plate at each meal with fruits and vegetables.       Eat whole grains, such as whole-wheat pasta, brown rice, or whole-grain bread. Fill about one-fourth of your plate with whole grains.       Eat low-fat dairy products.       Avoid fatty cuts of meat, processed or cured meats, and poultry with skin. Fill about one-fourth of your plate with lean proteins such as fish, chicken without skin, beans, eggs, and tofu.       Avoid pre-made and processed foods. These tend to be higher in sodium, added sugar, and fat.       Reduce your daily sodium intake. Most people with hypertension should eat less than 1,500 mg of sodium a day.     Lifestyle            Work with your health care provider to maintain a healthy body weight or to lose weight. Ask what an ideal weight is for you.       Get at least 30 minutes of exercise that causes your heart to beat faster (aerobic exercise) most days of the week. Activities may include walking, swimming, or biking.       Include exercise to strengthen your muscles (resistance exercise), such as weight lifting, as part of your weekly exercise routine. Try to do these types of exercises for 30 minutes at least 3 days a week.      Do not  use any products that contain nicotine or tobacco, such as cigarettes, e-cigarettes, and chewing tobacco. If you need help quitting, ask your health care provider.       Control any long-term (chronic) conditions you have, such as high cholesterol or diabetes.       Identify your sources of stress and find ways to manage stress. This may include meditation, deep breathing, or making time for fun activities.       Alcohol use        Do not  drink alcohol if:       Your health care provider tells you not to drink.       You are pregnant, may be pregnant, or are planning to become pregnant.      If you drink alcohol:      Limit how much you use to:       0?1 drink a day for women.        0?2 drinks a day for men.       Be aware of how much alcohol is in your drink. In the [place] one drink equals one 12 oz bottle of beer (355 mL), one 5 oz glass of wine (148 mL), or one 1? oz glass of hard liquor (44 mL).     Medicines    Your health care provider may prescribe medicine if lifestyle changes are not enough to get your blood pressure under control and if:       Your systolic blood pressure is 130 or higher.       Your diastolic blood pressure is 80 or higher.     Take medicines only as told by your health care provider. Follow the directions carefully. Blood pressure medicines must be taken as told by your health care provider. The medicine does not work as well when you skip doses. Skipping doses also puts you at risk for problems.   Monitoring       Before you monitor your blood pressure:      Do not  smoke, drink caffeinated beverages, or exercise within 30 minutes before taking a measurement.       Use the bathroom and empty your bladder (urinate).       Sit quietly for at least 5 minutes before taking measurements.      Monitor your blood pressure at home as told by your health care provider. To do this:       Sit with your back straight and supported.       Place your feet flat on the floor. Do not  cross your legs.       Support your arm on a flat surface, such as a table. Make sure your upper arm is at heart level.       Each time you measure, take two or three readings one minute apart and record the results.     You may also need to have your blood pressure checked regularly by your health care provider.     General information         Talk with your health care provider about your diet, exercise habits, and other lifestyle factors that may be contributing to hypertension.       Review all the medicines you take with your health care provider because there may be side effects or interactions.       Keep all visits as told by your health care provider. Your health care provider can help  you create and adjust your plan for managing your high blood pressure.       Where to find more information         National [employer], [employer], [employer] [place]: www.nhlbi.nih.gov         American Heart Association: www.heart.org       Contact a health care provider if:         You think you are having a reaction to medicines you have taken.       You have repeated (recurrent) headaches.       You feel dizzy.       You have swelling in your ankles.       You have trouble with your vision.     Get help right away if:         You develop a severe headache or confusion.       You have unusual weakness or numbness, or you feel faint.       You have severe pain in your chest or abdomen.       You vomit repeatedly.       You have trouble breathing.     These symptoms may represent a serious problem that is an emergency. Do not wait to see if the symptoms will go away. Get medical help right away. Call your local emergency services (911 in the [place] Do not drive yourself to the hospital.   Summary         Hypertension is when the force of blood pumping through your arteries is too strong. If this condition is not controlled, it may put you at risk for serious complications.       Your personal target blood pressure may vary depending on your medical conditions, your age, and other factors. For most people, a normal blood pressure is less than 120/80.       Hypertension is managed by lifestyle changes, medicines, or both.       Lifestyle changes to help manage hypertension include losing weight, eating a healthy, low-sodium diet, exercising more, stopping smoking, and limiting alcohol.     This information is not intended to replace advice given to you by your health care provider. Make sure you discuss any questions you have with your health care provider.     Document Released: 09/11/2013Document Revised: 01/22/2021Document Reviewed: 11/17/2020     [employer] Patient Education ? 2021 [employer]         Supraventricular  Tachycardia, Adult     Supraventricular tachycardia (SVT) is a kind of abnormal heartbeat. It makes your heart beat very fast. This may last for a short time and then return to normal, or it may last longer.    A normal resting heartbeat is 60?100 times a minute. This condition can make your heart beat more than 150 times a minute. Times of having a fast heartbeat (episodes) can be scary, but they are usually not dangerous. In some cases, they may lead to heart failure if they:       Happen many times a day.       Last longer than a few seconds.     What are the causes?      This condition happens when electrical signals are sent out from areas of the heart that do not normally send signals for the heartbeat.     What increases the risk?    You are more likely to develop this condition if you are:       Middle aged or younger.       Female.      The following factors may also make you more likely to develop this condition:       Stress.       Feeling worried or nervous (anxiety).       Tiredness.       Smoking.       Stimulant drugs, such as cocaine and methamphetamine.       Alcohol.       Caffeine.       Pregnancy.       Having certain medical conditions.     What are the signs or symptoms?         A pounding heart.       A feeling that your heart is skipping beats (palpitations).       Weakness.       Trouble getting enough air.       Pain or tightness in your chest.       Dizziness or feeling like you are going to pass out (faint).       Feeling worried or nervous.       Sweating.       Feeling like you may vomit (nausea).       Passing out.       Tiredness.     Sometimes, there are no symptoms.   How is this treated?    Treatment may include:      Vagal nerve stimulation. Ways to do this include:       Holding your breath and pushing, as though you are pooping (having a bowel movement).       Massaging an area on one side of your neck. Do no  t try this yourself. Only a doctor should do this. If done the wrong  way, it can lead to a stroke.       Bending forward with your head between your legs.       Coughing while bending forward with your head between your legs.       Putting an ice-cold, wet towel on your face.       Medicines that prevent attacks.       Medicine to stop an attack given through an IV tube at the hospital.       A small electric shock (cardioversion) that stops an attack.       A procedure to get rid of cells in the area that is causing the fast heartbeats (radiofrequency ablation).     If you do not have symptoms, you may not need treatment.   Follow these instructions at home:   Stress         Avoid things that make you feel stressed.      To deal with stress, try:       Doing yoga or meditation.       Being out in nature.       Listening to relaxing music.       Doing deep breathing.       Taking steps to be healthy, such as getting lots of sleep, exercising, and eating a balanced diet.       Talking with a mental health doctor.     Lifestyle            Try to get at least 7 hours of sleep each night.      Do not  smoke or use any products that contain nicotine or tobacco. If you need help quitting, ask your doctor.      Do not  drink alcohol if it gives you a fast heartbeat.      If alcohol does not seem to give you a fast heartbeat, limit your alcohol use. If you drink alcohol:      Limit how much you have to:       0?1 drink a day for women who are not pregnant.       0?2 drinks a day for men.           Know how much alcohol is in your drink. In the [place] one drink equals one 12 oz bottle of beer (355 mL), one 5 oz glass of wine (148 mL), or one 1? oz glass of hard liquor (44 mL).        Be aware of how caffeine affects you.       If caffeine gives you a fast heartbeat, do not  eat, drink, or use anything with caffeine in it.       If caffeine does not seem to give you a fast heartbeat, limit how much caffeine you eat, drink, or use.      Do not  use stimulant drugs. If you need help quitting, ask  your doctor.   General instructions         Stay at a healthy weight.      Exercise regularly. Ask your doctor about good activities for you. Try one or a mixture of these:       150 minutes a week of gentle exercise, like walking or yoga.       75 minutes a week of exercise that is very active, like running or swimming.       Do vagus nerve treatments to slow down your heartbeat as told by your doctor.       Take over-the-counter and prescription medicines only as told by your doctor.       Keep all follow-up visits.       Contact a doctor if:         You have a fast heartbeat more often.       Times of having a fast heartbeat last longer than before.       Home treatments to slow down your heartbeat do not help.       You have new symptoms.     Get help right away if:         You have chest pain.       Your symptoms get worse.       You have trouble breathing.       Your heart beats very fast for more than 20 minutes.       You pass out.     These symptoms may be an emergency. Get medical help right away. Call your local emergency services (911 in the [place]      Do not wait to see if the symptoms will go away.      Do not drive yourself to the hospital.     Summary         SVT is a type of abnormal heartbeat.       This condition can make your heart beat more than 150 times a minute.       If you do not have symptoms, you may not need treatment.     This information is not intended to replace advice given to you by your health care provider. Make sure you discuss any questions you have with your health care provider.     Document Released: 12/18/2006Document Revised: 07/31/2021Document Reviewed: 07/31/2021     [employer] Patient Education ? 2021 [employer]

## 2022-03-07 NOTE — PROGRESS NOTES
Chief Complaint  Hypertension and Hyperlipidemia (F/u with EKG)    Subjective            History of Present Illness  Michael Martin is a 66-year-old white/ male patient who presents to the office today for follow-up.  He has paroxysmal SVT, hypertension, and hyperlipidemia.  He reports compliance with all of his medications.  He denies any chest pain, shortness of breath, lightheadedness/dizziness, palpitations, or edema.    PMH  Past Medical History:   Diagnosis Date   • Cancer of prostate (HCC)    • Colon polyps    • Facial lesion    • Hyperlipidemia    • Hypertension    • Paroxysmal SVT 07/17/2021   • Skin cancer          ALLERGY  No Known Allergies       SURGICALHX  Past Surgical History:   Procedure Laterality Date   • ADENOIDECTOMY  April 1987   • APPENDECTOMY     • COLONOSCOPY  08/23/2016    TREMAINE HI, H/O COLON POLYPS   • COSMETIC SURGERY  Oct 2021   • EXCISION LESION     • PROSTATE BIOPSY  2007    NEG   • SKIN CANCER EXCISION            SOC  Social History     Socioeconomic History   • Marital status:    • Number of children: 2   Tobacco Use   • Smoking status: Never Smoker   • Smokeless tobacco: Never Used   Vaping Use   • Vaping Use: Never used   Substance and Sexual Activity   • Alcohol use: Not Currently     Alcohol/week: 3.0 standard drinks     Types: 3 Cans of beer per week     Comment: occassionally   • Drug use: Never   • Sexual activity: Defer         FAMHX  Family History   Problem Relation Age of Onset   • Diabetes Mother           MEDSIGONLY  Current Outpatient Medications on File Prior to Visit   Medication Sig   • amLODIPine-benazepril (Lotrel) 5-20 MG per capsule Take 1 capsule by mouth Daily.   • candesartan (ATACAND) 32 MG tablet Take 1 tablet by mouth Daily.   • metoprolol succinate XL (TOPROL-XL) 25 MG 24 hr tablet TAKE 1 TABLET DAILY   • rosuvastatin (CRESTOR) 5 MG tablet Take 5 mg by mouth Daily.   • sildenafil (VIAGRA) 100 MG tablet As Needed.     No current  "facility-administered medications on file prior to visit.         Objective   /84 (BP Location: Right arm)   Pulse 65   Ht 177.8 cm (70\")   Wt 93.4 kg (206 lb)   BMI 29.56 kg/m²       Physical Exam  HENT:      Head: Normocephalic.   Neck:      Vascular: No carotid bruit.   Cardiovascular:      Rate and Rhythm: Normal rate and regular rhythm.      Pulses: Normal pulses.      Heart sounds: Normal heart sounds. No murmur heard.  Pulmonary:      Effort: Pulmonary effort is normal.      Breath sounds: Normal breath sounds.   Musculoskeletal:      Cervical back: Neck supple.   Skin:     General: Skin is dry.      Capillary Refill: Capillary refill takes less than 2 seconds.   Neurological:      Mental Status: He is alert and oriented to person, place, and time.   Psychiatric:         Behavior: Behavior normal.       ECG 12 Lead    Date/Time: 3/7/2022 2:04 PM  Performed by: Miranda Montes APRN  Authorized by: Ialn Kim MD   Comparison: compared with previous ECG   Similar to previous ECG  Rhythm: sinus rhythm  Rate: normal  BPM: 64  Conduction: conduction normal  ST Segments: ST segments normal  T Waves: T waves normal  QRS axis: normal  Other findings: left ventricular hypertrophy and early transition    Clinical impression: non-specific ECG          Result Review :   The following data was reviewed by: SHELLY Patterson on 03/07/2022:  No results found for: PROBNP     No results found for: TSH   No results found for: FREET4   No results found for: DDIMERQUANT  No results found for: MG   No results found for: DIGOXIN   No results found for: TROPONINT        No recent labs for review, will request from PCP     05/03/2019   IMPRESSION:      1. Normal left ventricular size and systolic function, calculated to be   around 70%, based on biplane Rodriguez's method.  No regional wall motion abnormality was noted.  Normal wall thickness. Grade 1 diastolic dysfunction.      2. Normal right ventricular " size and systolic function.      3. Normal bi-atrial size.      4. No significant valvular abnormalities.        Assessment and Plan    Diagnoses and all orders for this visit:    1. Paroxysmal SVT (Primary)  Symptomatically stable at this time and EKG today shows sinus rhythm with rate of 64 bpm.  Continue metoprolol 25 mg daily.    2. Hypertension, essential  Currently controlled and without adverse effects from medication, continue Lotrel 5/20 mg daily and candesartan 32 mg daily.    3. Hyperlipemia, mixed  No recent lipid panel for review, patient reports that he had blood work done a month ago with PCP.  Continue rosuvastatin 5 mg daily for now until labs can be reviewed by me.    Other orders  -     ECG 12 Lead        Follow Up   Return in about 8 months (around 11/7/2022) for Follow up with Dr Kim.    Patient was given instructions and counseling regarding his condition or for health maintenance advice. Please see specific information pulled into the AVS if appropriate.     Michael Martin  reports that he has never smoked. He has never used smokeless tobacco.           SHELLY Patterson  03/07/22  13:35 EST    Dictated Utilizing Dragon Dictation

## 2022-09-19 ENCOUNTER — TELEPHONE (OUTPATIENT)
Dept: UROLOGY | Facility: CLINIC | Age: 67
End: 2022-09-19

## 2022-09-19 NOTE — TELEPHONE ENCOUNTER
Pt is getting scheduled for his annual appt. He is supposed to have a MRI prior to visit. Can you check on this and call the patient and let him know details please.

## 2022-09-20 NOTE — TELEPHONE ENCOUNTER
Spoke with patient informing him that [facility] has a hub that will call and schedule his MRI in November.

## 2022-10-13 DIAGNOSIS — R00.2 PALPITATIONS: ICD-10-CM

## 2022-10-13 RX ORDER — METOPROLOL SUCCINATE 25 MG/1
TABLET, EXTENDED RELEASE ORAL
Qty: 90 TABLET | Refills: 0 | Status: SHIPPED | OUTPATIENT
Start: 2022-10-13 | End: 2022-12-28

## 2022-10-25 ENCOUNTER — TELEPHONE (OUTPATIENT)
Dept: UROLOGY | Facility: CLINIC | Age: 67
End: 2022-10-25

## 2022-10-25 NOTE — TELEPHONE ENCOUNTER
Spoke with PT and scheduled his MRI prostate at [facility] on November 9 at 2:30 with a 2:00 arrival time. I confirmed with him that he did not have any metal or implants in his body and instructed him to not wear any metal or jewelry to the appointment. He verbalized understanding.

## 2022-11-06 NOTE — PROGRESS NOTES
[facility]  Cardiology progress Note    Patient Name: Michael Martin  : 1955    CHIEF COMPLAINT  Palpitations        Subjective   Subjective     HISTORY OF PRESENT ILLNESS    Michael Martin is a 67 y.o. male with history of palpitations and SVT.  No chest pain or shortness of breath.    REVIEW OF SYSTEMS    Constitutional:    No fever, no weight loss  Skin:     No rash  Otolaryngeal:    No difficulty swallowing  Cardiovascular: See HPI.  Pulmonary:    No cough, no sputum production    Personal History     Social History:    reports that he has never smoked. He has never used smokeless tobacco. He reports that he does not currently use alcohol after a past usage of about 3.0 standard drinks per week. He reports that he does not use drugs.    Home Medications:  Current Outpatient Medications on File Prior to Visit   Medication Sig   • amLODIPine-benazepril (Lotrel) 5-20 MG per capsule Take 1 capsule by mouth Daily.   • candesartan (ATACAND) 32 MG tablet Take 1 tablet by mouth Daily.   • metoprolol succinate XL (TOPROL-XL) 25 MG 24 hr tablet TAKE 1 TABLET DAILY   • rosuvastatin (CRESTOR) 5 MG tablet Take 5 mg by mouth Daily.   • sildenafil (VIAGRA) 100 MG tablet As Needed.     No current facility-administered medications on file prior to visit.       Past Medical History:   Diagnosis Date   • Cancer of prostate (HCC)    • Colon polyps    • Facial lesion    • Hyperlipidemia    • Hypertension    • Paroxysmal SVT 2021   • Skin cancer        Allergies:  No Known Allergies    Objective    Objective       Vitals:   Heart Rate:  [68] 68  BP: (134)/(96) 134/96  Body mass index is 29.56 kg/m².     PHYSICAL EXAM:    General Appearance:   · well developed  · well nourished  HENT:   · oropharynx moist  · lips not cyanotic  Neck:  · thyroid not enlarged  · supple  Respiratory:  · no respiratory distress  · normal breath sounds  · no rales  Cardiovascular:  · no jugular venous distention  · regular  rhythm  · apical impulse normal  · S1 normal, S2 normal  · no S3, no S4   · no murmur  · no rub, no thrill  · carotid pulses normal; no bruit  · pedal pulses normal  · lower extremity edema: none    Skin:   · warm, dry  Psychiatric:  · judgement and insight appropriate  · normal mood and affect        Result Review:  I have personally reviewed the available results from  [x]  Laboratory  [x]  EKG  [x]  Cardiology  [x]  Medications  [x]  Old records  []  Other:     Procedures     Impression/Plan:  1.  Stable palpitations/PSVT: Continue Toprol-XL 25 mg a day.  No further palpitations.  2.  Hyperlipidemia: Continue Crestor 5 mg a day.  Monitor lipid and hepatic profile.  3.  Essential hypertension controlled: Continue Atacand 32 mg a day.  Continue Lotrel 5/20 mg a day.  Monitor blood pressure regularly.           Ilan Kim MD   11/08/22   13:14 EST

## 2022-11-08 ENCOUNTER — OFFICE VISIT (OUTPATIENT)
Dept: CARDIOLOGY | Facility: CLINIC | Age: 67
End: 2022-11-08

## 2022-11-08 VITALS
HEIGHT: 70 IN | SYSTOLIC BLOOD PRESSURE: 134 MMHG | HEART RATE: 68 BPM | BODY MASS INDEX: 29.49 KG/M2 | WEIGHT: 206 LBS | DIASTOLIC BLOOD PRESSURE: 96 MMHG

## 2022-11-08 DIAGNOSIS — E78.2 HYPERLIPEMIA, MIXED: ICD-10-CM

## 2022-11-08 DIAGNOSIS — I10 HYPERTENSION, ESSENTIAL: ICD-10-CM

## 2022-11-08 DIAGNOSIS — R00.2 PALPITATIONS: Primary | ICD-10-CM

## 2022-11-08 PROCEDURE — 99214 OFFICE O/P EST MOD 30 MIN: CPT | Performed by: SPECIALIST

## 2022-11-10 DIAGNOSIS — C61 PROSTATE CANCER: ICD-10-CM

## 2022-11-29 DIAGNOSIS — C61 PROSTATE CANCER: Primary | ICD-10-CM

## 2022-12-21 ENCOUNTER — LAB (OUTPATIENT)
Dept: LAB | Facility: HOSPITAL | Age: 67
End: 2022-12-21

## 2022-12-21 DIAGNOSIS — C61 PROSTATE CANCER: ICD-10-CM

## 2022-12-21 LAB — PSA SERPL-MCNC: 11.5 NG/ML (ref 0–4)

## 2022-12-21 PROCEDURE — 36415 COLL VENOUS BLD VENIPUNCTURE: CPT

## 2022-12-21 PROCEDURE — 84153 ASSAY OF PSA TOTAL: CPT

## 2022-12-26 DIAGNOSIS — R00.2 PALPITATIONS: ICD-10-CM

## 2022-12-28 RX ORDER — METOPROLOL SUCCINATE 25 MG/1
TABLET, EXTENDED RELEASE ORAL
Qty: 90 TABLET | Refills: 2 | Status: SHIPPED | OUTPATIENT
Start: 2022-12-28

## 2022-12-30 ENCOUNTER — OFFICE VISIT (OUTPATIENT)
Dept: UROLOGY | Facility: CLINIC | Age: 67
End: 2022-12-30

## 2022-12-30 VITALS — BODY MASS INDEX: 29.89 KG/M2 | HEIGHT: 70 IN | WEIGHT: 208.8 LBS

## 2022-12-30 DIAGNOSIS — C61 PROSTATE CANCER: Primary | ICD-10-CM

## 2022-12-30 DIAGNOSIS — N13.8 BPH WITH OBSTRUCTION/LOWER URINARY TRACT SYMPTOMS: ICD-10-CM

## 2022-12-30 DIAGNOSIS — N40.1 BPH WITH OBSTRUCTION/LOWER URINARY TRACT SYMPTOMS: ICD-10-CM

## 2022-12-30 LAB
BILIRUB BLD-MCNC: NEGATIVE MG/DL
CLARITY, POC: CLEAR
COLOR UR: YELLOW
EXPIRATION DATE: ABNORMAL
GLUCOSE UR STRIP-MCNC: NEGATIVE MG/DL
KETONES UR QL: ABNORMAL
LEUKOCYTE EST, POC: ABNORMAL
Lab: ABNORMAL
NITRITE UR-MCNC: NEGATIVE MG/ML
PH UR: 5.5 [PH] (ref 5–8)
PROT UR STRIP-MCNC: NEGATIVE MG/DL
RBC # UR STRIP: NEGATIVE /UL
SP GR UR: 1.02 (ref 1–1.03)
UROBILINOGEN UR QL: NORMAL

## 2022-12-30 PROCEDURE — 99214 OFFICE O/P EST MOD 30 MIN: CPT | Performed by: UROLOGY

## 2022-12-30 PROCEDURE — 81003 URINALYSIS AUTO W/O SCOPE: CPT | Performed by: UROLOGY

## 2022-12-30 RX ORDER — TAMSULOSIN HYDROCHLORIDE 0.4 MG/1
1 CAPSULE ORAL DAILY
Qty: 90 CAPSULE | Refills: 3 | Status: SHIPPED | OUTPATIENT
Start: 2022-12-30 | End: 2023-12-25

## 2022-12-30 NOTE — PROGRESS NOTES
Chief Complaint  Prostate Cancer    Subjective          Michael Martin presents to [facility] UROLOGY     History of Present Illness  Mr. Martin presents today for follow-up for elevated PSA. Last year it was 10.36. He is a 66-year-old man on active surveillance for prostate cancer. His PSA now is currently 9 and had a repeat prostate MRI which showed benign prostatic hyperplasia and chronic prostatitis. But no lesions concerning for significant prostate cancer were seen.       63yo male referred by Dr. Mitchell for prostate cancer. He is on AS. He was diagnosed in Dec 2015. PSA at that time was 7.89. TRUS bx showed [place] 3+3 in 2 cores in left sided hypoechoic area.     The patient had repeat PSA in 7/16. At that time, it was 9.8. The patient had repeat TRUS bx and this showed [place] 3+3 in 1 core on the right side. The patient then had a repeat PSA in April 2017 and PSA increased to 17.4. He presents for eval. We discussed his PSA. He denies any new urinary symptoms. He is not on any meds for his prostate.     The patient does not know if there is a family history of prostate cancer in his family.     Patient now presents after having MRI of the prostate. There were no suspicious T2 localizing lesions in the prostate gland. The patnient did have an enlarged gland of 68gm and there was some chronic prostatitis. The patient and I reviewed this study. He would like to remain on AS. I explained that I prefer for patients to have PSA less than 10 to be on AS. He understands that he would be stretching the boundaries for AS.     The patient now presents after having prostate biopsy. He has done well from the biopsy. There was no cancer seen on the biopsy. The patient and I reviewed this. He would like to continue on AS.     The patient now presents in follow up. He is doing well. He denies any new urinary issues. The patient had a repeat PSA. His PSA in April was 17 and it is now down to 14. We  reviewed this. The patient would like to continue on AS. We discussed the need for an annual biopsy while on AS. He is aware of this.     6/11/18 - 63yo male presents in follow up. He is doing well. He had a recent PSA and this has gone down to 11.9 from 14. We discussed this. I would like to get an MRI of the prostate and see if there are any suspicious lesions in the prostate. From there, I would see him back. If that study is negative for suspicious lesions then I would hold on a biopsy. If there is a concerning lesion, I would do a fusion biopsy.     7/3/18 - Patient presents in follow up. The patient had an MRI of the prostate. This showed a volume of 71ml. There was no suspicious T2 signal within the peripheral zone favored to reflect chronic inflammation. There was no focal abnormality suspicious for clinically significant prostate cancer. We discussed this. I discussed holding on a biopsy this year given that his PSA is less and there were no lesions on the MRI. I would like to see him back in 1 year with a PSA. We discussed that typically a TRUS bx is recommended annually for AS patients, however, with his negative MRI. I would like to omit that. I think that next year, the patient will possibly need a biopsy.     7/8/19 - Patient presents in follow up. He is doing well. He denies any urinary issues. The patient had a recent PSA and this has increased slightly to 12.8. We discussed this. His last biopsy was in 2017 and was negative. His MRI was negative for localizing lesions in the prostate to suggest clinically significant prostate cancer. We discussed repeating this study and possibly doing a biopsy after as he has not had one in 2 years. He is agreeable to this.     8/2/19 - Patient presents in follow up. He had an MRI of the prostate. This showed a volume of 81gm. There were no localizing lesions to suggest significant prostate cancer. We discussed that. I discussed that the MRI is not always accurate.  "I discussed that it has been 2 years since last biopsy and his PSA is rising. I would feel better to do a TRUS bx of the prostate. He is in agreement with this. I discussed pain, bleeding, infection, ED as risks. He would like to proceed.     9/3/19 - Patient presents in follow up. He is doing well after his TRUS bx of the prostate. His path showed aytpia and inflammation, but no cancer. We discussed this. I have recommended observation. We discussed the possibility of a false negative and understaging.     10/29/20 - His most recent PSA is 10.36.  His prostate MRI last year was negative.  His biopsy was negative.  We will repeat his prostate MRI given he is on active surveillance.     11/22/2021-  Mr. Martin states that he is doing good. He denies that he has anything else going on.  His most recent PSA is 9.  His prostate MRI was negative for lesions concerning for prostate cancer.     12/30/22:  His most recent PSA is 11 in December 2022.  Recent prostate MRI shows no areas concerning for prostate cancer.      The patient presents today for a follow-up.He reports that he is doing well. He states that he had 2 biopsies that showed cancer and 2 after that which did not show anything. He reports that he has became accustomed to the symptoms of an enlarged prostate over the years. He denies taking Flomax.    A review of systems was completed and positive findings are noted in the HPI.      Objective   Vital Signs:   Ht 177.8 cm (70\")   Wt 94.7 kg (208 lb 12.8 oz)   BMI 29.96 kg/m²       Physical Exam  Vitals and nursing note reviewed.   Constitutional:       Appearance: Normal appearance. He is well-developed.   Pulmonary:      Effort: Pulmonary effort is normal.      Breath sounds: Normal air entry.   Neurological:      Mental Status: He is alert and oriented to person, place, and time.      Motor: Motor function is intact.   Psychiatric:         Mood and Affect: Mood normal.         Behavior: Behavior normal. "           Result Review :   The following data was reviewed by: Megan Whiteside MD on 2022:    Results for orders placed or performed in visit on 22   POC Urinalysis Dipstick, Automated    Specimen: Urine   Result Value Ref Range    Color Yellow Yellow, Straw, Dark Yellow, Rosalind    Clarity, UA Clear Clear    Specific Gravity  1.025 1.005 - 1.030    pH, Urine 5.5 5.0 - 8.0    Leukocytes Trace (A) Negative    Nitrite, UA Negative Negative    Protein, POC Negative Negative mg/dL    Glucose, UA Negative Negative mg/dL    Ketones, UA Trace (A) Negative    Urobilinogen, UA Normal Normal, 0.2 E.U./dL    Bilirubin Negative Negative    Blood, UA Negative Negative    Lot Number [id number]     Expiration Date         PSA    PSA 22   PSA 11.500 (A)   (A) Abnormal value                Data reviewed: Radiologic studies Prostate MRI:   MRI Prostate W & WO Contrast  Order: [id number]  Addendum    This result is currently undergoing an addendum.     Addendum by Yue Muir MD on 11/10/2022  8:47 AM EST   REVIEWING YOUR TEST RESULTS IN [facility] IS NOT A SUBSTITUTE FOR   DISCUSSING THOSE RESULTS WITH YOUR HEALTH CARE PROVIDER.   PLEASE CONTACT YOUR PROVIDER VIA [facility] TO DISCUSS ANY QUESTIONS OR   CONCERNS YOU MAY HAVE REGARDING THESE TEST RESULTS.     RADIOLOGY REPORT     FACILITY:  [facility]   UNIT/AGE/GENDER: N.MRI  OP      AGE:67 Y          SEX:M   PATIENT NAME/:  VANESSA TOVAR P    1955   UNIT NUMBER:  [id number]   ACCOUNT NUMBER:  [id number]   ACCESSION NUMBER:  [id number]     * * *AMENDED REPORT* * *       ADDENDUM:     Technologist who performed the scan was contacted and reported the   patient's IV leaked although did not infiltrate.     Dictated by: Yue Muir M.D.     Images and Report reviewed and interpreted by: Yue Muir M.D.     <PS><Electronically signed by: Yue Muir M.D.>   11/10/2022 0846     D: 11/10/2022 0846   T: 11/10/2022  0846  Narrative    REVIEWING YOUR TEST RESULTS IN [facility] IS NOT A SUBSTITUTE FOR DISCUSSING THOSE RESULTS WITH YOUR HEALTH CARE PROVIDER.   PLEASE CONTACT YOUR PROVIDER VIA [employer] [facility] TO DISCUSS ANY QUESTIONS OR CONCERNS YOU MAY HAVE REGARDING THESE TEST RESULTS.     RADIOLOGY REPORT     FACILITY:  [facility]   UNIT/AGE/GENDER: N.MRI  OP      AGE:67 Y          SEX:M   PATIENT NAME/:  VANESSA TOVAR P    1955   UNIT NUMBER:  [id number]   ACCOUNT NUMBER:  [id number]   ACCESSION NUMBER:  [id number]     MRI of the prostate with and without contrast dated 2022.     INDICATION: Elevated PSA. Prostate cancer. Reported positive biopsy  and  and reported negative biopsies 2017 and 2018. Active surveillance.     TECHNIQUE: Multi parametric prostate protocol with high-resolution T1 and T2 weighted sequences, precontrast, diffusion weighted imaging and multiphase dynamic gadolinium enhanced gradient echo imaging during contrast administration. Post processing   includes construction of 3-D prostate volume and 3-D regions of interest for potential biopsy when applicable, performed on a separate workstation by the radiologist.     COMPARISON: 2021     FINDINGS: Poor contrast opacification of the prostate gland presumably related to extravasation of contrast. There is no note from the technologist. This was discussed with Latrice in MRI November 10, 2022 at 8:17 AM and if this was not recognized at the   time of the scan, MRI will contact the patient directly to ensure there are no complications at the site of IV injection.     Prostate measures 6.1 x 5.6 x 6.3 cm for total volume of 108.52 mL.     Heterogeneous signal intensity and enhancement in the enlarged central gland is related to BPH.     There is hazy and streaky decreased T2 signal intensity in the thinned bilateral peripheral zones without significant restricted diffusion and favored to be related to chronic  prostatitis.     No suspicious T2 localizing lesion to suggest significant prostate malignancy.     Bladder wall thickening related to outlet obstruction. Seminal vesicles are symmetric.     Pelvic bowel loops appear under distended and grossly appear unremarkable. No ascites or significant lymphadenopathy. No suspicious osseous lesion.     IMPRESSION:   1. No suspicious T2 localizing lesion to suggest significant prostate malignancy.   2. BPH.   3. Chronic prostatitis.     Dictated by: Yue Muir M.D.            Assessment and Plan    Diagnoses and all orders for this visit:    1. Prostate cancer (HCC) (Primary)  Assessment & Plan:  We will get a repeat MRI around this time next year as well as a PSA and continue to follow it at this point. He is agreeable with that.    Orders:  -     POC Urinalysis Dipstick, Automated  -     MRI Prostate With & Without Contrast; Future    2. BPH with obstruction/lower urinary tract symptoms  Assessment & Plan:  We will start him on Flomax for his BPH symptoms.  - I will see him back in 1 year.    Orders:  -     tamsulosin (FLOMAX) 0.4 MG capsule 24 hr capsule; Take 1 capsule by mouth Daily for 360 days.  Dispense: 90 capsule; Refill: 3          Follow Up       Return in about 1 year (around 12/30/2023) for PSA prior to visit, prostate MRI prior.  Patient was given instructions and counseling regarding his condition or for health maintenance advice. Please see specific information pulled into the AVS if appropriate.     Transcribed from ambient dictation for Megan Whiteside MD by Marjorie Kidd.  12/30/22   14:48 EST    Patient or patient representative verbalized consent to the visit recording.  I have personally performed the services described in this document as transcribed by the above individual, and it is both accurate and complete.  Megan Whiteside MD  12/30/2022  21:54 EST

## 2022-12-30 NOTE — ASSESSMENT & PLAN NOTE
We will get a repeat MRI around this time next year as well as a PSA and continue to follow it at this point. He is agreeable with that.

## 2023-05-31 NOTE — PROGRESS NOTES
[facility]  Cardiology progress Note    Patient Name: Michael Martin  : 1955    CHIEF COMPLAINT  Palpitation        Subjective   Subjective     HISTORY OF PRESENT ILLNESS    Michael Martin is a 67 y.o. male with history of palpitation and PSVT.  No further palpitations    REVIEW OF SYSTEMS    Constitutional:    No fever, no weight loss  Skin:     No rash  Otolaryngeal:    No difficulty swallowing  Cardiovascular: See HPI.  Pulmonary:    No cough, no sputum production    Personal History     Social History:    reports that he has never smoked. He has never used smokeless tobacco. He reports current alcohol use of about 3.0 standard drinks per week. He reports that he does not use drugs.    Home Medications:  Current Outpatient Medications on File Prior to Visit   Medication Sig   • amLODIPine-benazepril (Lotrel) 5-20 MG per capsule Take 1 capsule by mouth Daily.   • candesartan (ATACAND) 32 MG tablet Take 1 tablet by mouth Daily.   • metoprolol succinate XL (TOPROL-XL) 25 MG 24 hr tablet TAKE 1 TABLET DAILY   • rosuvastatin (CRESTOR) 5 MG tablet Take 1 tablet by mouth Daily.   • sildenafil (VIAGRA) 100 MG tablet As Needed.   • tamsulosin (FLOMAX) 0.4 MG capsule 24 hr capsule Take 1 capsule by mouth Daily for 360 days.     No current facility-administered medications on file prior to visit.       Past Medical History:   Diagnosis Date   • Cancer of prostate    • Colon polyps    • Facial lesion    • Heart murmur     unsure   • Hyperlipidemia    • Hypertension    • Paroxysmal SVT 2021   • Skin cancer        Allergies:  No Known Allergies    Objective    Objective       Vitals:   Heart Rate:  [64] 64  BP: (130)/(88) 130/88  Body mass index is 28.98 kg/m².     PHYSICAL EXAM:    General Appearance:   · well developed  · well nourished  HENT:   · oropharynx moist  · lips not cyanotic  Neck:  · thyroid not enlarged  · supple  Respiratory:  · no respiratory distress  · normal breath sounds  · no  rales  Cardiovascular:  · no jugular venous distention  · regular rhythm  · apical impulse normal  · S1 normal, S2 normal  · no S3, no S4   · no murmur  · no rub, no thrill  · carotid pulses normal; no bruit  · pedal pulses normal  · lower extremity edema: none    Skin:   · warm, dry  Psychiatric:  · judgement and insight appropriate  · normal mood and affect        Result Review:  I have personally reviewed the available results from  [x]  Laboratory  [x]  EKG  [x]  Cardiology  [x]  Medications  [x]  Old records  []  Other:     Procedures     Impression/Plan:  1.  Essential hypertension controlled: Continue Atacand 32 mg once a day.  Continue Lotrel 5/20 mg once a day.  Monitor blood pressure regularly.  2.  Hyperlipidemia: Continue Crestor 5 mg once a day.  Monitor lipid and hepatic profile.  3.  Palpitations/PSVT: Continue Toprol-XL 25 mg once a day.  No palpitations           Ilan Kim MD   06/01/23   13:23 EDT

## 2023-06-01 ENCOUNTER — OFFICE VISIT (OUTPATIENT)
Dept: CARDIOLOGY | Facility: CLINIC | Age: 68
End: 2023-06-01

## 2023-06-01 VITALS
SYSTOLIC BLOOD PRESSURE: 130 MMHG | DIASTOLIC BLOOD PRESSURE: 88 MMHG | HEART RATE: 64 BPM | BODY MASS INDEX: 28.92 KG/M2 | WEIGHT: 202 LBS | HEIGHT: 70 IN

## 2023-06-01 DIAGNOSIS — E78.2 HYPERLIPEMIA, MIXED: ICD-10-CM

## 2023-06-01 DIAGNOSIS — I10 HYPERTENSION, ESSENTIAL: ICD-10-CM

## 2023-06-01 DIAGNOSIS — R00.2 PALPITATIONS: Primary | ICD-10-CM

## 2023-06-01 PROCEDURE — 99214 OFFICE O/P EST MOD 30 MIN: CPT | Performed by: SPECIALIST

## 2023-06-01 PROCEDURE — 3075F SYST BP GE 130 - 139MM HG: CPT | Performed by: SPECIALIST

## 2023-06-01 PROCEDURE — 1160F RVW MEDS BY RX/DR IN RCRD: CPT | Performed by: SPECIALIST

## 2023-06-01 PROCEDURE — 3079F DIAST BP 80-89 MM HG: CPT | Performed by: SPECIALIST

## 2023-06-01 PROCEDURE — 1159F MED LIST DOCD IN RCRD: CPT | Performed by: SPECIALIST

## 2023-10-24 ENCOUNTER — TELEPHONE (OUTPATIENT)
Dept: UROLOGY | Facility: CLINIC | Age: 68
End: 2023-10-24
Payer: MEDICARE

## 2023-10-24 NOTE — TELEPHONE ENCOUNTER
Informed scheduled at [address] on 11/28/23 at 10:45 with arrival time of 10:15. We also booked a follow up appointment on 12/6/23 at 3:45. Patient voiced understanding.

## 2023-11-28 DIAGNOSIS — C61 PROSTATE CANCER: ICD-10-CM

## 2023-11-29 ENCOUNTER — LAB (OUTPATIENT)
Dept: LAB | Facility: HOSPITAL | Age: 68
End: 2023-11-29
Payer: MEDICARE

## 2023-11-29 DIAGNOSIS — C61 PROSTATE CANCER: Primary | ICD-10-CM

## 2023-11-29 DIAGNOSIS — E78.2 HYPERLIPEMIA, MIXED: Primary | ICD-10-CM

## 2023-11-29 DIAGNOSIS — C61 PROSTATE CANCER: ICD-10-CM

## 2023-11-30 ENCOUNTER — LAB (OUTPATIENT)
Dept: LAB | Facility: HOSPITAL | Age: 68
End: 2023-11-30
Payer: MEDICARE

## 2023-11-30 DIAGNOSIS — E78.2 HYPERLIPEMIA, MIXED: ICD-10-CM

## 2023-11-30 LAB
ALBUMIN SERPL-MCNC: 4.6 G/DL (ref 3.5–5.2)
ALP SERPL-CCNC: 44 U/L (ref 39–117)
ALT SERPL W P-5'-P-CCNC: 18 U/L (ref 1–41)
AST SERPL-CCNC: 22 U/L (ref 1–40)
BILIRUB CONJ SERPL-MCNC: <0.2 MG/DL (ref 0–0.3)
BILIRUB INDIRECT SERPL-MCNC: NORMAL MG/DL
BILIRUB SERPL-MCNC: 0.6 MG/DL (ref 0–1.2)
CHOLEST SERPL-MCNC: 154 MG/DL (ref 0–200)
HDLC SERPL-MCNC: 43 MG/DL (ref 40–60)
LDLC SERPL CALC-MCNC: 83 MG/DL (ref 0–100)
LDLC/HDLC SERPL: 1.81 {RATIO}
PROT SERPL-MCNC: 7 G/DL (ref 6–8.5)
PSA SERPL-MCNC: 9.58 NG/ML (ref 0–4)
TRIGL SERPL-MCNC: 165 MG/DL (ref 0–150)
VLDLC SERPL-MCNC: 28 MG/DL (ref 5–40)

## 2023-11-30 PROCEDURE — 84153 ASSAY OF PSA TOTAL: CPT

## 2023-11-30 PROCEDURE — 36415 COLL VENOUS BLD VENIPUNCTURE: CPT

## 2023-11-30 PROCEDURE — 80061 LIPID PANEL: CPT

## 2023-11-30 PROCEDURE — 80076 HEPATIC FUNCTION PANEL: CPT

## 2023-12-01 ENCOUNTER — TELEPHONE (OUTPATIENT)
Dept: CARDIOLOGY | Facility: CLINIC | Age: 68
End: 2023-12-01
Payer: MEDICARE

## 2023-12-01 NOTE — TELEPHONE ENCOUNTER
----- Message from SHELLY Thompson sent at 12/1/2023 11:01 AM EST -----  Lipid panel shows slightly elevated triglycerides, recommend low sugar/low carb diet and less red meat  Liver enzymes are good

## 2023-12-05 ENCOUNTER — OFFICE VISIT (OUTPATIENT)
Dept: CARDIOLOGY | Facility: CLINIC | Age: 68
End: 2023-12-05
Payer: MEDICARE

## 2023-12-05 VITALS
DIASTOLIC BLOOD PRESSURE: 85 MMHG | HEART RATE: 68 BPM | WEIGHT: 207.4 LBS | BODY MASS INDEX: 29.69 KG/M2 | SYSTOLIC BLOOD PRESSURE: 129 MMHG | HEIGHT: 70 IN

## 2023-12-05 DIAGNOSIS — I47.10 PAROXYSMAL SVT (SUPRAVENTRICULAR TACHYCARDIA): Primary | ICD-10-CM

## 2023-12-05 DIAGNOSIS — I10 HYPERTENSION, ESSENTIAL: ICD-10-CM

## 2023-12-05 NOTE — PROGRESS NOTES
Chief Complaint  Follow-up    Subjective            History of Present Illness  Michael Martin is a 68-year-old white/ male patient who presents to the office today for follow-up.  He has paroxysmal SVT and hypertension.  He is compliant with medications.  He reports occasional palpitations and lightheadedness which are typically brief in nature and resolves on their own.  He denies any chest pain, shortness of breath, or edema.    PMH  Past Medical History:   Diagnosis Date    Cancer of prostate     Colon polyps     Facial lesion     Hyperlipidemia     Hypertension     Paroxysmal SVT 07/17/2021    Skin cancer          ALLERGY  No Known Allergies       SURGICALHX  Past Surgical History:   Procedure Laterality Date    ADENOIDECTOMY  04/1987    APPENDECTOMY      COLONOSCOPY  08/23/2016    TREMAINE HI, H/O COLON POLYPS    COSMETIC SURGERY  10/2021    EXCISION LESION      PROSTATE BIOPSY  2007    NEG    SKIN CANCER EXCISION            SOC  Social History     Socioeconomic History    Marital status:     Number of children: 2   Tobacco Use    Smoking status: Never    Smokeless tobacco: Never   Vaping Use    Vaping Use: Never used   Substance and Sexual Activity    Alcohol use: Yes     Alcohol/week: 3.0 standard drinks of alcohol     Types: 3 Cans of beer per week     Comment: occassionally    Drug use: Never    Sexual activity: Yes     Partners: Female     Birth control/protection: None         FAMHX  Family History   Problem Relation Age of Onset    Diabetes Mother     Asthma Mother           MEDSIGONLY  Current Outpatient Medications on File Prior to Visit   Medication Sig    amLODIPine-benazepril (Lotrel) 5-20 MG per capsule Take 1 capsule by mouth Daily.    candesartan (ATACAND) 32 MG tablet Take 1 tablet by mouth Daily.    metoprolol succinate XL (TOPROL-XL) 25 MG 24 hr tablet TAKE 1 TABLET DAILY    rosuvastatin (CRESTOR) 5 MG tablet Take 1 tablet by mouth Daily.    sildenafil (VIAGRA) 100 MG  "tablet As Needed.    tamsulosin (FLOMAX) 0.4 MG capsule 24 hr capsule Take 1 capsule by mouth Daily for 360 days.     No current facility-administered medications on file prior to visit.         Objective   /85 (BP Location: Left arm, Patient Position: Sitting, Cuff Size: Large Adult)   Pulse 68   Ht 177.8 cm (70\")   Wt 94.1 kg (207 lb 6.4 oz)   BMI 29.76 kg/m²       Physical Exam  HENT:      Head: Normocephalic.   Neck:      Vascular: No carotid bruit.   Cardiovascular:      Rate and Rhythm: Normal rate and regular rhythm.      Pulses: Normal pulses.      Heart sounds: Normal heart sounds. No murmur heard.  Pulmonary:      Effort: Pulmonary effort is normal.      Breath sounds: Normal breath sounds.   Musculoskeletal:      Cervical back: Neck supple.      Right lower leg: No edema.      Left lower leg: No edema.   Skin:     General: Skin is dry.   Neurological:      Mental Status: He is alert and oriented to person, place, and time.   Psychiatric:         Behavior: Behavior normal.       ECG 12 Lead    Date/Time: 12/5/2023 1:02 PM  Performed by: Miranda Montes APRN    Authorized by: Miranda Montes APRN  Comparison: compared with previous ECG from 3/7/2022  Similar to previous ECG  Rhythm: sinus rhythm  Rate: normal  BPM: 67  Conduction: conduction normal  ST Segments: ST segments normal  QRS axis: normal  Other findings: T wave abnormality and left ventricular hypertrophy    Clinical impression: non-specific ECG      Result Review :   The following data was reviewed by: SHELLY Patterson on 12/05/2023:  No results found for: \"PROBNP\"  CMP          11/30/2023    09:30   CMP   Total Protein 7.0    Albumin 4.6    Total Bilirubin 0.6    Alkaline Phosphatase 44    AST (SGOT) 22    ALT (SGPT) 18         No results found for: \"TSH\"   No results found for: \"FREET4\"   No results found for: \"DDIMERQUANT\"  No results found for: \"MG\"   No results found for: \"DIGOXIN\"   No results found for: " "\"TROPONINT\"        Lipid Panel          11/30/2023    09:30   Lipid Panel   Total Cholesterol 154    Triglycerides 165    HDL Cholesterol 43    VLDL Cholesterol 28    LDL Cholesterol  83    LDL/HDL Ratio 1.81             Assessment and Plan    Diagnoses and all orders for this visit:    1. Paroxysmal SVT (Primary)  Symptomatically stable at this time, continue metoprolol 25 mg daily.    2. Hypertension, essential  Currently controlled without adverse effects from medication, continue Lotrel 5-20 mg daily and candesartan 32 mg daily.      Other orders  -     ECG 12 Lead            Follow Up   Return in about 6 months (around 6/5/2024) for Follow up with Dr Kim.    Patient was given instructions and counseling regarding his condition or for health maintenance advice. Please see specific information pulled into the AVS if appropriate.     Michael Martin  reports that he has never smoked. He has never used smokeless tobacco.         Miranda Montes, APRN  12/05/23  12:58 EST    Dictated Utilizing Dragon Dictation    "

## 2023-12-06 ENCOUNTER — TELEPHONE (OUTPATIENT)
Dept: UROLOGY | Facility: CLINIC | Age: 68
End: 2023-12-06

## 2023-12-06 DIAGNOSIS — C61 PROSTATE CANCER: Primary | ICD-10-CM

## 2023-12-06 NOTE — TELEPHONE ENCOUNTER
Dr. Whiteside spoke with patient about recent MRI and we are canceling the appointment and rescheduling for a 6 month follow up with a PSA prior. Patient verbalized understanding.

## 2024-01-01 DIAGNOSIS — R00.2 PALPITATIONS: ICD-10-CM

## 2024-01-02 RX ORDER — METOPROLOL SUCCINATE 25 MG/1
TABLET, EXTENDED RELEASE ORAL
Qty: 90 TABLET | Refills: 3 | Status: SHIPPED | OUTPATIENT
Start: 2024-01-02

## 2024-01-04 DIAGNOSIS — N13.8 BPH WITH OBSTRUCTION/LOWER URINARY TRACT SYMPTOMS: ICD-10-CM

## 2024-01-04 DIAGNOSIS — N40.1 BPH WITH OBSTRUCTION/LOWER URINARY TRACT SYMPTOMS: ICD-10-CM

## 2024-01-04 RX ORDER — TAMSULOSIN HYDROCHLORIDE 0.4 MG/1
1 CAPSULE ORAL DAILY
Qty: 90 CAPSULE | Refills: 3 | Status: SHIPPED | OUTPATIENT
Start: 2024-01-04

## 2024-06-06 ENCOUNTER — LAB (OUTPATIENT)
Dept: LAB | Facility: HOSPITAL | Age: 69
End: 2024-06-06
Payer: MEDICARE

## 2024-06-06 DIAGNOSIS — C61 PROSTATE CANCER: ICD-10-CM

## 2024-06-06 LAB — PSA SERPL-MCNC: 9.81 NG/ML (ref 0–4)

## 2024-06-06 PROCEDURE — 84153 ASSAY OF PSA TOTAL: CPT

## 2024-06-06 PROCEDURE — 36415 COLL VENOUS BLD VENIPUNCTURE: CPT

## 2024-06-10 ENCOUNTER — OFFICE VISIT (OUTPATIENT)
Dept: UROLOGY | Facility: CLINIC | Age: 69
End: 2024-06-10
Payer: MEDICARE

## 2024-06-10 VITALS
WEIGHT: 207 LBS | BODY MASS INDEX: 29.63 KG/M2 | HEIGHT: 70 IN | SYSTOLIC BLOOD PRESSURE: 152 MMHG | DIASTOLIC BLOOD PRESSURE: 95 MMHG

## 2024-06-10 DIAGNOSIS — C61 PROSTATE CANCER: Primary | ICD-10-CM

## 2024-06-10 LAB
BILIRUB BLD-MCNC: NEGATIVE MG/DL
CLARITY, POC: CLEAR
COLOR UR: YELLOW
EXPIRATION DATE: ABNORMAL
GLUCOSE UR STRIP-MCNC: NEGATIVE MG/DL
KETONES UR QL: NEGATIVE
LEUKOCYTE EST, POC: ABNORMAL
Lab: ABNORMAL
NITRITE UR-MCNC: NEGATIVE MG/ML
PH UR: 5.5 [PH] (ref 5–8)
PROT UR STRIP-MCNC: NEGATIVE MG/DL
RBC # UR STRIP: NEGATIVE /UL
SP GR UR: 1.01 (ref 1–1.03)
UROBILINOGEN UR QL: ABNORMAL

## 2024-06-10 PROCEDURE — 3080F DIAST BP >= 90 MM HG: CPT | Performed by: UROLOGY

## 2024-06-10 PROCEDURE — 81003 URINALYSIS AUTO W/O SCOPE: CPT | Performed by: UROLOGY

## 2024-06-10 PROCEDURE — 3077F SYST BP >= 140 MM HG: CPT | Performed by: UROLOGY

## 2024-06-10 PROCEDURE — 1159F MED LIST DOCD IN RCRD: CPT | Performed by: UROLOGY

## 2024-06-10 PROCEDURE — 1160F RVW MEDS BY RX/DR IN RCRD: CPT | Performed by: UROLOGY

## 2024-06-10 PROCEDURE — 99213 OFFICE O/P EST LOW 20 MIN: CPT | Performed by: UROLOGY

## 2024-06-10 NOTE — PROGRESS NOTES
Chief Complaint  Prostate Cancer    Subjective          Michael Martin presents to [facility]    History of Present Illness  Mr. Martin presents today for follow-up for elevated PSA.  It is currently 9.8.  It was 9.5 in November 2023.  Prior to that it was 10.36. He is a 66-year-old man on active surveillance for prostate cancer. His PSA now is currently 9.  He has had repeat prostate MRI which showed benign prostatic hyperplasia and chronic prostatitis. But no lesions concerning for significant prostate cancer were seen.         He is on AS. He was diagnosed in Dec 2015. PSA at that time was 7.89. TRUS bx showed [place] 3+3 in 2 cores in left sided hypoechoic area.     The patient had repeat PSA in 7/16. At that time, it was 9.8. The patient had repeat TRUS bx and this showed [place] 3+3 in 1 core on the right side. The patient then had a repeat PSA in April 2017 and PSA increased to 17.4. He presents for eval. We discussed his PSA. He denies any new urinary symptoms. He is not on any meds for his prostate.      The patient does not know if there is a family history of prostate cancer in his family.     Patient now presents after having MRI of the prostate. There were no suspicious T2 localizing lesions in the prostate gland. The patnient did have an enlarged gland of 68gm and there was some chronic prostatitis. The patient and I reviewed this study. He would like to remain on AS. I explained that I prefer for patients to have PSA less than 10 to be on AS. He understands that he would be stretching the boundaries for AS.     The patient now presents after having prostate biopsy. He has done well from the biopsy. There was no cancer seen on the biopsy. The patient and I reviewed this. He would like to continue on AS.     The patient now presents in follow up. He is doing well. He denies any new urinary issues. The patient had a repeat PSA. His PSA in April was 17 and it is now down to  14. We reviewed this. The patient would like to continue on AS. We discussed the need for an annual biopsy while on AS. He is aware of this.     6/11/18 - 61yo male presents in follow up. He is doing well. He had a recent PSA and this has gone down to 11.9 from 14. We discussed this. I would like to get an MRI of the prostate and see if there are any suspicious lesions in the prostate. From there, I would see him back. If that study is negative for suspicious lesions then I would hold on a biopsy. If there is a concerning lesion, I would do a fusion biopsy.     7/3/18 - Patient presents in follow up. The patient had an MRI of the prostate. This showed a volume of 71ml. There was no suspicious T2 signal within the peripheral zone favored to reflect chronic inflammation. There was no focal abnormality suspicious for clinically significant prostate cancer. We discussed this. I discussed holding on a biopsy this year given that his PSA is less and there were no lesions on the MRI. I would like to see him back in 1 year with a PSA. We discussed that typically a TRUS bx is recommended annually for AS patients, however, with his negative MRI. I would like to omit that. I think that next year, the patient will possibly need a biopsy.     7/8/19 - Patient presents in follow up. He is doing well. He denies any urinary issues. The patient had a recent PSA and this has increased slightly to 12.8. We discussed this. His last biopsy was in 2017 and was negative. His MRI was negative for localizing lesions in the prostate to suggest clinically significant prostate cancer. We discussed repeating this study and possibly doing a biopsy after as he has not had one in 2 years. He is agreeable to this.     8/2/19 - Patient presents in follow up. He had an MRI of the prostate. This showed a volume of 81gm. There were no localizing lesions to suggest significant prostate cancer. We discussed that. I discussed that the MRI is not always  "accurate. I discussed that it has been 2 years since last biopsy and his PSA is rising. I would feel better to do a TRUS bx of the prostate. He is in agreement with this. I discussed pain, bleeding, infection, ED as risks. He would like to proceed.     9/3/19 - Patient presents in follow up. He is doing well after his TRUS bx of the prostate. His path showed aytpia and inflammation, but no cancer. We discussed this. I have recommended observation. We discussed the possibility of a false negative and understaging.     10/29/20 - His most recent PSA is 10.36.  His prostate MRI last year was negative.  His biopsy was negative.  We will repeat his prostate MRI given he is on active surveillance.     11/22/2021-  Mr. Martin states that he is doing good. He denies that he has anything else going on.  His most recent PSA is 9.  His prostate MRI was negative for lesions concerning for prostate cancer.      12/30/22:  His most recent PSA is 11 in December 2022.  Recent prostate MRI shows no areas concerning for prostate cancer.           Objective   Vital Signs:   /95   Ht 177.8 cm (70\")   Wt 93.9 kg (207 lb)   BMI 29.70 kg/m²       Physical Exam  Vitals and nursing note reviewed.   Constitutional:       Appearance: Normal appearance. He is well-developed.   Pulmonary:      Effort: Pulmonary effort is normal.      Breath sounds: Normal air entry.   Neurological:      Mental Status: He is alert and oriented to person, place, and time.      Motor: Motor function is intact.   Psychiatric:         Mood and Affect: Mood normal.         Behavior: Behavior normal.          Result Review :   The following data was reviewed by: eMgan Whiteside MD on 06/10/2024:    Results for orders placed or performed in visit on 06/10/24   POC Urinalysis Dipstick, Automated    Specimen: Urine   Result Value Ref Range    Color Yellow Yellow, Straw, Dark Yellow, Rosalind    Clarity, UA Clear Clear    Specific Gravity  1.010 1.005 - 1.030    " pH, Urine 5.5 5.0 - 8.0    Leukocytes Trace (A) Negative    Nitrite, UA Negative Negative    Protein, POC Negative Negative mg/dL    Glucose, UA Negative Negative mg/dL    Ketones, UA Negative Negative    Urobilinogen, UA 0.2 E.U./dL Normal, 0.2 E.U./dL    Bilirubin Negative Negative    Blood, UA Negative Negative    Lot Number [id number]     Expiration Date 42,025          PSA          11/30/2023    09:30 6/6/2024    13:47   PSA   PSA 9.580  9.810               Assessment and Plan    Diagnoses and all orders for this visit:    1. Prostate cancer (Primary)  -     POC Urinalysis Dipstick, Automated  -     PSA DIAGNOSTIC; Future    We will continue on active surveillance.  PSA in 6 months and I will see him then.  PSA is currently stable at 9 which is about his baseline.          Follow Up       Return in about 6 months (around 12/10/2024) for Next scheduled follow up, PSA prior to visit.  Patient was given instructions and counseling regarding his condition or for health maintenance advice. Please see specific information pulled into the AVS if appropriate.

## 2024-06-21 NOTE — PROGRESS NOTES
[facility]  Cardiology progress Note    Patient Name: Michael Martin  : 1955    CHIEF COMPLAINT  Palpitations        Subjective   Subjective     HISTORY OF PRESENT ILLNESS    Michael Martin is a 69 y.o. male with history of palpitations and hypertension.  No further palpitations.    REVIEW OF SYSTEMS    Constitutional:    No fever, no weight loss  Skin:     No rash  Otolaryngeal:    No difficulty swallowing  Cardiovascular: See HPI.  Pulmonary:    No cough, no sputum production    Personal History     Social History:    reports that he has never smoked. He has never used smokeless tobacco. He reports current alcohol use of about 3.0 standard drinks of alcohol per week. He reports that he does not use drugs.    Home Medications:  Current Outpatient Medications on File Prior to Visit   Medication Sig    amLODIPine-benazepril (Lotrel) 5-20 MG per capsule Take 1 capsule by mouth Daily.    candesartan (ATACAND) 32 MG tablet Take 1 tablet by mouth Daily.    metoprolol succinate XL (TOPROL-XL) 25 MG 24 hr tablet TAKE 1 TABLET DAILY    rosuvastatin (CRESTOR) 5 MG tablet Take 1 tablet by mouth Daily.    sildenafil (VIAGRA) 100 MG tablet As Needed.     No current facility-administered medications on file prior to visit.       Past Medical History:   Diagnosis Date    Cancer of prostate     Colon polyps     Facial lesion     Heart murmur     Hyperlipidemia     Hypertension     Paroxysmal SVT 2021    Skin cancer        Allergies:  No Known Allergies    Objective    Objective       Vitals:   Heart Rate:  [58] 58  BP: (149)/(98) 149/98  Body mass index is 28.98 kg/m².     PHYSICAL EXAM:    General Appearance:   well developed  well nourished  HENT:   oropharynx moist  lips not cyanotic  Neck:  thyroid not enlarged  supple  Respiratory:  no respiratory distress  normal breath sounds  no rales  Cardiovascular:  no jugular venous distention  regular rhythm  apical impulse normal  S1 normal, S2  normal  no S3, no S4   no murmur  no rub, no thrill  carotid pulses normal; no bruit  pedal pulses normal  lower extremity edema: none    Skin:   warm, dry  Psychiatric:  judgement and insight appropriate  normal mood and affect        Result Review:  I have personally reviewed the available results from  [x]  Laboratory  [x]  EKG  [x]  Cardiology  [x]  Medications  [x]  Old records  []  Other:     Procedures  Lab Results   Component Value Date    CHOL 154 11/30/2023     Lab Results   Component Value Date    TRIG 165 (H) 11/30/2023     Lab Results   Component Value Date    HDL 43 11/30/2023     Lab Results   Component Value Date    LDL 83 11/30/2023     Lab Results   Component Value Date    VLDL 28 11/30/2023        Impression/Plan:  1.  Palpitations/PSVT: Continue Toprol-XL 25 mg once a day.  No palpitations.  2.  Mixed hyperlipidemia: Continue Crestor 5 mg once a day.  Monitor lipid and hepatic profile.  3.  Essential hypertension controlled: Continue Atacand 32 mg once a day.  Continue Lotrel 5/20 mg once a day.  Monitor blood pressure regularly.           Ilan Kim MD   06/25/24   13:23 EDT

## 2024-06-25 ENCOUNTER — OFFICE VISIT (OUTPATIENT)
Dept: CARDIOLOGY | Facility: CLINIC | Age: 69
End: 2024-06-25
Payer: MEDICARE

## 2024-06-25 VITALS
DIASTOLIC BLOOD PRESSURE: 98 MMHG | SYSTOLIC BLOOD PRESSURE: 149 MMHG | WEIGHT: 202 LBS | HEART RATE: 58 BPM | BODY MASS INDEX: 28.92 KG/M2 | HEIGHT: 70 IN

## 2024-06-25 DIAGNOSIS — I10 HYPERTENSION, ESSENTIAL: ICD-10-CM

## 2024-06-25 DIAGNOSIS — R00.2 PALPITATIONS: Primary | ICD-10-CM

## 2024-06-25 DIAGNOSIS — E78.2 HYPERLIPEMIA, MIXED: ICD-10-CM

## 2024-06-25 PROCEDURE — 3077F SYST BP >= 140 MM HG: CPT | Performed by: SPECIALIST

## 2024-06-25 PROCEDURE — 99214 OFFICE O/P EST MOD 30 MIN: CPT | Performed by: SPECIALIST

## 2024-06-25 PROCEDURE — 1159F MED LIST DOCD IN RCRD: CPT | Performed by: SPECIALIST

## 2024-06-25 PROCEDURE — 3080F DIAST BP >= 90 MM HG: CPT | Performed by: SPECIALIST

## 2024-06-25 PROCEDURE — 1160F RVW MEDS BY RX/DR IN RCRD: CPT | Performed by: SPECIALIST

## 2024-12-03 ENCOUNTER — LAB (OUTPATIENT)
Dept: LAB | Facility: HOSPITAL | Age: 69
End: 2024-12-03
Payer: MEDICARE

## 2024-12-03 DIAGNOSIS — C61 PROSTATE CANCER: ICD-10-CM

## 2024-12-03 LAB — PSA SERPL-MCNC: 9.37 NG/ML (ref 0–4)

## 2024-12-03 PROCEDURE — 36415 COLL VENOUS BLD VENIPUNCTURE: CPT

## 2024-12-03 PROCEDURE — 84153 ASSAY OF PSA TOTAL: CPT

## 2024-12-09 ENCOUNTER — OFFICE VISIT (OUTPATIENT)
Dept: UROLOGY | Age: 69
End: 2024-12-09
Payer: MEDICARE

## 2024-12-09 VITALS
HEIGHT: 70 IN | BODY MASS INDEX: 28.92 KG/M2 | SYSTOLIC BLOOD PRESSURE: 139 MMHG | DIASTOLIC BLOOD PRESSURE: 94 MMHG | WEIGHT: 202 LBS

## 2024-12-09 DIAGNOSIS — C61 PROSTATE CANCER: Primary | ICD-10-CM

## 2024-12-09 LAB
BILIRUB BLD-MCNC: NEGATIVE MG/DL
CLARITY, POC: CLEAR
COLOR UR: YELLOW
EXPIRATION DATE: ABNORMAL
GLUCOSE UR STRIP-MCNC: NEGATIVE MG/DL
KETONES UR QL: NEGATIVE
LEUKOCYTE EST, POC: ABNORMAL
Lab: ABNORMAL
NITRITE UR-MCNC: NEGATIVE MG/ML
PH UR: 5.5 [PH] (ref 5–8)
PROT UR STRIP-MCNC: NEGATIVE MG/DL
RBC # UR STRIP: NEGATIVE /UL
SP GR UR: 1.03 (ref 1–1.03)
UROBILINOGEN UR QL: ABNORMAL

## 2024-12-09 PROCEDURE — 1159F MED LIST DOCD IN RCRD: CPT | Performed by: UROLOGY

## 2024-12-09 PROCEDURE — 1160F RVW MEDS BY RX/DR IN RCRD: CPT | Performed by: UROLOGY

## 2024-12-09 PROCEDURE — 3080F DIAST BP >= 90 MM HG: CPT | Performed by: UROLOGY

## 2024-12-09 PROCEDURE — 99213 OFFICE O/P EST LOW 20 MIN: CPT | Performed by: UROLOGY

## 2024-12-09 PROCEDURE — 3075F SYST BP GE 130 - 139MM HG: CPT | Performed by: UROLOGY

## 2024-12-09 PROCEDURE — 81003 URINALYSIS AUTO W/O SCOPE: CPT | Performed by: UROLOGY

## 2024-12-09 NOTE — PROGRESS NOTES
Chief Complaint  Prostate Cancer    Subjective          Michael Martin presents to [facility] UROLOG[facility]  History of Present Illness  Mr. Martin presents today for follow-up for elevated PSA.  It is currently 9.3.  It was 9.5 in November 2023.  Prior to that it was 10.36. He is a 66-year-old man on active surveillance for prostate cancer. His PSA now is currently 9.  He has had repeat prostate MRI which showed benign prostatic hyperplasia and chronic prostatitis in October 2023. But no lesions concerning for significant prostate cancer were seen.          He is on AS. He was diagnosed in Dec 2015. PSA at that time was 7.89. TRUS bx showed [place] 3+3 in 2 cores in left sided hypoechoic area.     The patient had repeat PSA in 7/16. At that time, it was 9.8. The patient had repeat TRUS bx and this showed [place] 3+3 in 1 core on the right side. The patient then had a repeat PSA in April 2017 and PSA increased to 17.4. He presents for eval. We discussed his PSA. He denies any new urinary symptoms. He is not on any meds for his prostate.      The patient does not know if there is a family history of prostate cancer in his family.     Patient now presents after having MRI of the prostate. There were no suspicious T2 localizing lesions in the prostate gland. The patnient did have an enlarged gland of 68gm and there was some chronic prostatitis. The patient and I reviewed this study. He would like to remain on AS. I explained that I prefer for patients to have PSA less than 10 to be on AS. He understands that he would be stretching the boundaries for AS.     The patient now presents after having prostate biopsy. He has done well from the biopsy. There was no cancer seen on the biopsy. The patient and I reviewed this. He would like to continue on AS.     The patient now presents in follow up. He is doing well. He denies any new urinary issues. The patient had a repeat PSA. His PSA in April was 17 and it is  now down to 14. We reviewed this. The patient would like to continue on AS. We discussed the need for an annual biopsy while on AS. He is aware of this.     6/11/18 - 61yo male presents in follow up. He is doing well. He had a recent PSA and this has gone down to 11.9 from 14. We discussed this. I would like to get an MRI of the prostate and see if there are any suspicious lesions in the prostate. From there, I would see him back. If that study is negative for suspicious lesions then I would hold on a biopsy. If there is a concerning lesion, I would do a fusion biopsy.     7/3/18 - Patient presents in follow up. The patient had an MRI of the prostate. This showed a volume of 71ml. There was no suspicious T2 signal within the peripheral zone favored to reflect chronic inflammation. There was no focal abnormality suspicious for clinically significant prostate cancer. We discussed this. I discussed holding on a biopsy this year given that his PSA is less and there were no lesions on the MRI. I would like to see him back in 1 year with a PSA. We discussed that typically a TRUS bx is recommended annually for AS patients, however, with his negative MRI. I would like to omit that. I think that next year, the patient will possibly need a biopsy.     7/8/19 - Patient presents in follow up. He is doing well. He denies any urinary issues. The patient had a recent PSA and this has increased slightly to 12.8. We discussed this. His last biopsy was in 2017 and was negative. His MRI was negative for localizing lesions in the prostate to suggest clinically significant prostate cancer. We discussed repeating this study and possibly doing a biopsy after as he has not had one in 2 years. He is agreeable to this.     8/2/19 - Patient presents in follow up. He had an MRI of the prostate. This showed a volume of 81gm. There were no localizing lesions to suggest significant prostate cancer. We discussed that. I discussed that the MRI is  "not always accurate. I discussed that it has been 2 years since last biopsy and his PSA is rising. I would feel better to do a TRUS bx of the prostate. He is in agreement with this. I discussed pain, bleeding, infection, ED as risks. He would like to proceed.     9/3/19 - Patient presents in follow up. He is doing well after his TRUS bx of the prostate. His path showed aytpia and inflammation, but no cancer. We discussed this. I have recommended observation. We discussed the possibility of a false negative and understaging.     10/29/20 - His most recent PSA is 10.36.  His prostate MRI last year was negative.  His biopsy was negative.  We will repeat his prostate MRI given he is on active surveillance.     11/22/2021-  Mr. Martin states that he is doing good. He denies that he has anything else going on.  His most recent PSA is 9.  His prostate MRI was negative for lesions concerning for prostate cancer.      12/30/22:  His most recent PSA is 11 in December 2022.  Recent prostate MRI shows no areas concerning for prostate cancer.      12/9/2024: His most recent PSA is 9.3.  This is consistent with previous PSAs.  He had a prostate MRI in October 2023 that was negative.        Objective   Vital Signs:   /94   Ht 177.8 cm (70\")   Wt 91.6 kg (202 lb)   BMI 28.98 kg/m²       Physical Exam  Vitals and nursing note reviewed.   Constitutional:       Appearance: Normal appearance. He is well-developed.   Pulmonary:      Effort: Pulmonary effort is normal.      Breath sounds: Normal air entry.   Neurological:      Mental Status: He is alert and oriented to person, place, and time.      Motor: Motor function is intact.   Psychiatric:         Mood and Affect: Mood normal.         Behavior: Behavior normal.          Result Review :   The following data was reviewed by: Megan Whiteside MD on 12/09/2024:    Results for orders placed or performed in visit on 12/09/24   POC Urinalysis Dipstick, Automated    Collection " Time: 12/09/24  9:02 AM    Specimen: Urine   Result Value Ref Range    Color Yellow Yellow, Straw, Dark Yellow, Rosalind    Clarity, UA Clear Clear    Specific Gravity  1.030 1.005 - 1.030    pH, Urine 5.5 5.0 - 8.0    Leukocytes Trace (A) Negative    Nitrite, UA Negative Negative    Protein, POC Negative Negative mg/dL    Glucose, UA Negative Negative mg/dL    Ketones, UA Negative Negative    Urobilinogen, UA 0.2 E.U./dL Normal, 0.2 E.U./dL    Bilirubin Negative Negative    Blood, UA Negative Negative    Lot Number [id number]     Expiration Date 102,025        PSA          6/6/2024    13:47 12/3/2024    11:08   PSA   PSA 9.810  9.370             Assessment and Plan    Diagnoses and all orders for this visit:    1. Prostate cancer (Primary)  -     POC Urinalysis Dipstick, Automated  -     PSA DIAGNOSTIC; Future    Will recheck a PSA in 6 months.  Continue active surveillance.        Follow Up       Return in about 6 months (around 6/9/2025) for Next scheduled follow up, PSA prior to visit.  Patient was given instructions and counseling regarding his condition or for health maintenance advice. Please see specific information pulled into the AVS if appropriate.

## 2024-12-26 DIAGNOSIS — R00.2 PALPITATIONS: ICD-10-CM

## 2024-12-26 RX ORDER — METOPROLOL SUCCINATE 25 MG/1
TABLET, EXTENDED RELEASE ORAL
Qty: 90 TABLET | Refills: 3 | Status: SHIPPED | OUTPATIENT
Start: 2024-12-26

## 2025-01-17 NOTE — PROGRESS NOTES
[facility]  Cardiology progress Note    Patient Name: Michael Martin  : 1955    CHIEF COMPLAINT  Palpitations        Subjective   Subjective     HISTORY OF PRESENT ILLNESS    Michael Martin is a 69 y.o. male with history of palpitations and PSVT.  No further palpitations.    REVIEW OF SYSTEMS    Constitutional:    No fever, no weight loss  Skin:     No rash  Otolaryngeal:    No difficulty swallowing  Cardiovascular: See HPI.  Pulmonary:    No cough, no sputum production    Personal History     Social History:    reports that he has never smoked. He has never used smokeless tobacco. He reports current alcohol use of about 3.0 standard drinks of alcohol per week. He reports that he does not use drugs.    Home Medications:  Current Outpatient Medications on File Prior to Visit   Medication Sig    amLODIPine-benazepril (Lotrel) 5-20 MG per capsule Take 1 capsule by mouth Daily.    candesartan (ATACAND) 32 MG tablet Take 1 tablet by mouth Daily.    metoprolol succinate XL (TOPROL-XL) 25 MG 24 hr tablet TAKE 1 TABLET DAILY    rosuvastatin (CRESTOR) 5 MG tablet Take 1 tablet by mouth Daily.    sildenafil (VIAGRA) 100 MG tablet As Needed.     No current facility-administered medications on file prior to visit.       Past Medical History:   Diagnosis Date    Benign prostatic hyperplasia     Cancer of prostate     Colon polyps     Elevated PSA     Erectile dysfunction     Facial lesion     Heart murmur     Hyperlipidemia     Hypertension     Paroxysmal SVT 2021    Prostatitis     Skin cancer        Allergies:  No Known Allergies    Objective    Objective       Vitals:   Heart Rate:  [66] 66  BP: (137)/(92) 137/92  Body mass index is 29.56 kg/m².     PHYSICAL EXAM:    General Appearance:   well developed  well nourished  HENT:   oropharynx moist  lips not cyanotic  Neck:  thyroid not enlarged  supple  Respiratory:  no respiratory distress  normal breath sounds  no  rales  Cardiovascular:  no jugular venous distention  regular rhythm  apical impulse normal  S1 normal, S2 normal  no S3, no S4   no murmur  no rub, no thrill  carotid pulses normal; no bruit  pedal pulses normal  lower extremity edema: none    Skin:   warm, dry  Psychiatric:  judgement and insight appropriate  normal mood and affect        Result Review:  I have personally reviewed the available results from  [x]  Laboratory  [x]  EKG  [x]  Cardiology  [x]  Medications  [x]  Old records  []  Other:     Procedures  Lab Results   Component Value Date    CHOL 154 11/30/2023     Lab Results   Component Value Date    TRIG 165 (H) 11/30/2023     Lab Results   Component Value Date    HDL 43 11/30/2023     Lab Results   Component Value Date    LDL 83 11/30/2023     Lab Results   Component Value Date    VLDL 28 11/30/2023        Impression/Plan:  1.  Palpitations/PSVT: Continue Toprol-XL 25 mg once a day.  No palpitations.  2.  Mixed hyperlipidemia: Continue Crestor 5 mg once a day.  Monitor lipid and hepatic profile.  3.  Essential hypertension controlled: Continue Atacand 32 mg once a day.  Monitor blood pressure regularly.           Ilan Kim MD   01/21/25   12:25 EST

## 2025-01-21 ENCOUNTER — OFFICE VISIT (OUTPATIENT)
Dept: CARDIOLOGY | Facility: CLINIC | Age: 70
End: 2025-01-21
Payer: MEDICARE

## 2025-01-21 VITALS
HEART RATE: 66 BPM | DIASTOLIC BLOOD PRESSURE: 92 MMHG | SYSTOLIC BLOOD PRESSURE: 137 MMHG | BODY MASS INDEX: 29.49 KG/M2 | WEIGHT: 206 LBS | HEIGHT: 70 IN

## 2025-01-21 DIAGNOSIS — I10 HYPERTENSION, ESSENTIAL: ICD-10-CM

## 2025-01-21 DIAGNOSIS — E78.2 HYPERLIPEMIA, MIXED: ICD-10-CM

## 2025-01-21 DIAGNOSIS — R00.2 PALPITATIONS: Primary | ICD-10-CM

## 2025-01-21 PROCEDURE — 3080F DIAST BP >= 90 MM HG: CPT | Performed by: SPECIALIST

## 2025-01-21 PROCEDURE — 3075F SYST BP GE 130 - 139MM HG: CPT | Performed by: SPECIALIST

## 2025-01-21 PROCEDURE — 99214 OFFICE O/P EST MOD 30 MIN: CPT | Performed by: SPECIALIST

## 2025-04-29 ENCOUNTER — TRANSCRIBE ORDERS (OUTPATIENT)
Dept: GENERAL RADIOLOGY | Facility: HOSPITAL | Age: 70
End: 2025-04-29
Payer: MEDICARE

## 2025-04-29 ENCOUNTER — HOSPITAL ENCOUNTER (OUTPATIENT)
Dept: GENERAL RADIOLOGY | Facility: HOSPITAL | Age: 70
Discharge: HOME | End: 2025-04-29
Admitting: INTERNAL MEDICINE
Payer: MEDICARE

## 2025-04-29 DIAGNOSIS — M54.50 CHRONIC LOW BACK PAIN, UNSPECIFIED BACK PAIN LATERALITY, UNSPECIFIED WHETHER SCIATICA PRESENT: Primary | ICD-10-CM

## 2025-04-29 DIAGNOSIS — G89.29 CHRONIC LOW BACK PAIN, UNSPECIFIED BACK PAIN LATERALITY, UNSPECIFIED WHETHER SCIATICA PRESENT: Primary | ICD-10-CM

## 2025-04-29 DIAGNOSIS — G89.29 CHRONIC LOW BACK PAIN, UNSPECIFIED BACK PAIN LATERALITY, UNSPECIFIED WHETHER SCIATICA PRESENT: ICD-10-CM

## 2025-04-29 DIAGNOSIS — M54.50 CHRONIC LOW BACK PAIN, UNSPECIFIED BACK PAIN LATERALITY, UNSPECIFIED WHETHER SCIATICA PRESENT: ICD-10-CM

## 2025-04-29 PROCEDURE — 72110 X-RAY EXAM L-2 SPINE 4/>VWS: CPT

## 2025-06-17 ENCOUNTER — LAB (OUTPATIENT)
Dept: LAB | Facility: HOSPITAL | Age: 70
End: 2025-06-17
Payer: MEDICARE

## 2025-06-17 DIAGNOSIS — C61 PROSTATE CANCER: ICD-10-CM

## 2025-06-17 LAB — PSA SERPL-MCNC: 8.71 NG/ML (ref 0–4)

## 2025-06-17 PROCEDURE — 84153 ASSAY OF PSA TOTAL: CPT

## 2025-06-17 PROCEDURE — 36415 COLL VENOUS BLD VENIPUNCTURE: CPT

## 2025-06-30 ENCOUNTER — OFFICE VISIT (OUTPATIENT)
Dept: UROLOGY | Age: 70
End: 2025-06-30
Payer: MEDICARE

## 2025-06-30 VITALS — HEIGHT: 70 IN | BODY MASS INDEX: 29.49 KG/M2 | WEIGHT: 206 LBS

## 2025-06-30 DIAGNOSIS — C61 PROSTATE CANCER: Primary | ICD-10-CM

## 2025-06-30 LAB
BILIRUB BLD-MCNC: NEGATIVE MG/DL
CLARITY, POC: CLEAR
COLOR UR: YELLOW
EXPIRATION DATE: 426
GLUCOSE UR STRIP-MCNC: NEGATIVE MG/DL
KETONES UR QL: NEGATIVE
LEUKOCYTE EST, POC: NEGATIVE
Lab: NORMAL
NITRITE UR-MCNC: NEGATIVE MG/ML
PH UR: 5.5 [PH] (ref 5–8)
PROT UR STRIP-MCNC: NEGATIVE MG/DL
RBC # UR STRIP: NEGATIVE /UL
SP GR UR: 1.03 (ref 1–1.03)
UROBILINOGEN UR QL: NORMAL

## 2025-06-30 NOTE — PROGRESS NOTES
Chief Complaint  Prostate Cancer    Subjective            Michael Martin presents to [facility]    History of Present Illness  The patient presents for evaluation of elevated PSA.    He has observed a gradual decrease in his PSA levels over the past few years. His most recent MRI was conducted in October 2023. He reports no current health issues and notes that his symptoms have remained stable for several years. He has undergone a total of 4 biopsies, with the first one dating back to 2015.   Mr. Martin presents today for follow-up for elevated PSA.  It is currently 9.3.  It was 9.5 in November 2023.  Prior to that it was 10.36. He is a 66-year-old man on active surveillance for prostate cancer. His PSA now is currently 9.  He has had repeat prostate MRI which showed benign prostatic hyperplasia and chronic prostatitis in October 2023. But no lesions concerning for significant prostate cancer were seen.          He is on AS. He was diagnosed in Dec 2015. PSA at that time was 7.89. TRUS bx showed [place] 3+3 in 2 cores in left sided hypoechoic area.     The patient had repeat PSA in 7/16. At that time, it was 9.8. The patient had repeat TRUS bx and this showed Myriam 3+3 in 1 core on the right side. The patient then had a repeat PSA in April 2017 and PSA increased to 17.4. He presents for eval. We discussed his PSA. He denies any new urinary symptoms. He is not on any meds for his prostate.      The patient does not know if there is a family history of prostate cancer in his family.     Patient now presents after having MRI of the prostate. There were no suspicious T2 localizing lesions in the prostate gland. The patnient did have an enlarged gland of 68gm and there was some chronic prostatitis. The patient and I reviewed this study. He would like to remain on AS. I explained that I prefer for patients to have PSA less than 10 to be on AS. He understands that he would be stretching the  boundaries for AS.     The patient now presents after having prostate biopsy. He has done well from the biopsy. There was no cancer seen on the biopsy. The patient and I reviewed this. He would like to continue on AS.     The patient now presents in follow up. He is doing well. He denies any new urinary issues. The patient had a repeat PSA. His PSA in April was 17 and it is now down to 14. We reviewed this. The patient would like to continue on AS. We discussed the need for an annual biopsy while on AS. He is aware of this.     6/11/18 - 61yo male presents in follow up. He is doing well. He had a recent PSA and this has gone down to 11.9 from 14. We discussed this. I would like to get an MRI of the prostate and see if there are any suspicious lesions in the prostate. From there, I would see him back. If that study is negative for suspicious lesions then I would hold on a biopsy. If there is a concerning lesion, I would do a fusion biopsy.     7/3/18 - Patient presents in follow up. The patient had an MRI of the prostate. This showed a volume of 71ml. There was no suspicious T2 signal within the peripheral zone favored to reflect chronic inflammation. There was no focal abnormality suspicious for clinically significant prostate cancer. We discussed this. I discussed holding on a biopsy this year given that his PSA is less and there were no lesions on the MRI. I would like to see him back in 1 year with a PSA. We discussed that typically a TRUS bx is recommended annually for AS patients, however, with his negative MRI. I would like to omit that. I think that next year, the patient will possibly need a biopsy.     7/8/19 - Patient presents in follow up. He is doing well. He denies any urinary issues. The patient had a recent PSA and this has increased slightly to 12.8. We discussed this. His last biopsy was in 2017 and was negative. His MRI was negative for localizing lesions in the prostate to suggest clinically  "significant prostate cancer. We discussed repeating this study and possibly doing a biopsy after as he has not had one in 2 years. He is agreeable to this.     8/2/19 - Patient presents in follow up. He had an MRI of the prostate. This showed a volume of 81gm. There were no localizing lesions to suggest significant prostate cancer. We discussed that. I discussed that the MRI is not always accurate. I discussed that it has been 2 years since last biopsy and his PSA is rising. I would feel better to do a TRUS bx of the prostate. He is in agreement with this. I discussed pain, bleeding, infection, ED as risks. He would like to proceed.     9/3/19 - Patient presents in follow up. He is doing well after his TRUS bx of the prostate. His path showed aytpia and inflammation, but no cancer. We discussed this. I have recommended observation. We discussed the possibility of a false negative and understaging.     10/29/20 - His most recent PSA is 10.36.  His prostate MRI last year was negative.  His biopsy was negative.  We will repeat his prostate MRI given he is on active surveillance.     11/22/2021-  Mr. Martin states that he is doing good. He denies that he has anything else going on.  His most recent PSA is 9.  His prostate MRI was negative for lesions concerning for prostate cancer.      12/30/22:  His most recent PSA is 11 in December 2022.  Recent prostate MRI shows no areas concerning for prostate cancer.       12/9/2024: His most recent PSA is 9.3.  This is consistent with previous PSAs.  He had a prostate MRI in October 2023 that was negative.      Objective   Vital Signs:   Ht 177.8 cm (70\")   Wt 93.4 kg (206 lb)   BMI 29.56 kg/m²       Physical Exam  Vitals and nursing note reviewed.   Constitutional:       Appearance: Normal appearance. He is well-developed.   Pulmonary:      Effort: Pulmonary effort is normal.      Breath sounds: Normal air entry.   Neurological:      Mental Status: He is alert and oriented " to person, place, and time.      Motor: Motor function is intact.   Psychiatric:         Mood and Affect: Mood normal.         Behavior: Behavior normal.          Result Review :   The following data was reviewed by: Megan Whiteside MD on 06/30/2025:    Results for orders placed or performed in visit on 06/30/25   POC Urinalysis Dipstick, Automated    Collection Time: 06/30/25 11:41 AM    Specimen: Urine   Result Value Ref Range    Color Yellow Yellow, Straw, Dark Yellow, Rosalind    Clarity, UA Clear Clear    Specific Gravity  1.030 1.005 - 1.030    pH, Urine 5.5 5.0 - 8.0    Leukocytes Negative Negative    Nitrite, UA Negative Negative    Protein, POC Negative Negative mg/dL    Glucose, UA Negative Negative mg/dL    Ketones, UA Negative Negative    Urobilinogen, UA 0.2 E.U./dL Normal, 0.2 E.U./dL    Bilirubin Negative Negative    Blood, UA Negative Negative    Lot Number [id number]     Expiration Date 426      PSA          12/3/2024    11:08 6/17/2025    12:38   PSA   PSA 9.370  8.710        Results  Labs   - PSA: 8.7 ng/mL   - PSA: 12/2024, 9.3 ng/mL   - PSA: 2024, 9.8 ng/mL   - PSA: 2023, 9.5 ng/mL   - PSA: 2022, 11 ng/mL   - PSA: 2015, 7.8 ng/mL    Imaging   - MRI: 2021, Negative   - MRI: 2023, Negative                Assessment and Plan    Diagnoses and all orders for this visit:    1. Prostate cancer (Primary)  -     POC Urinalysis Dipstick, Automated  -     PSA DIAGNOSTIC; Future  -     MRI Prostate With & Without Contrast; Future      Assessment & Plan  1. Elevated prostate-specific antigen (PSA) levels.  PSA levels have shown a consistent decrease over the past few years, which is a positive sign. The most recent MRI was conducted in 10/2023. Biopsy results from 2021 were negative, and MRI results from both 2021 and 2023 were also negative. Given that it has been nearly 2 years since the last MRI, another MRI is recommended before the next appointment. An order for an MRI will be placed, to be scheduled  towards the end of the year. A PSA test will also be ordered around the same time. The MRI can be done at the hospital, as there is no specific spot that needs to be followed from previous scans.    Follow-up  Follow-up in 6 months. Schedule an appointment on the way out and ensure both MRI and PSA tests are completed before the next visit.          Follow Up       No follow-ups on file.  Patient was given instructions and counseling regarding his condition or for health maintenance advice. Please see specific information pulled into the AVS if appropriate.     Transcribed from ambient dictation for Megan Whiteside MD by Megan Whiteside MD.  06/30/25   12:16 EDT    Patient or patient representative verbalized consent for the use of Ambient Listening during the visit with  Megan Whiteside MD for chart documentation. 6/30/2025  14:36 EDT

## 2025-07-25 NOTE — PROGRESS NOTES
[facility]  Cardiology progress Note    Patient Name: Michael Martin  : 1955    CHIEF COMPLAINT  Palpitations        Subjective   Subjective     HISTORY OF PRESENT ILLNESS    Michael Martin is a 70 y.o. male with history of palpitations and hypertension.    REVIEW OF SYSTEMS    Constitutional:    No fever, no weight loss  Skin:     No rash  Otolaryngeal:    No difficulty swallowing  Cardiovascular: See HPI.  Pulmonary:    No cough, no sputum production    Personal History     Social History:    reports that he has never smoked. He has never used smokeless tobacco. He reports current alcohol use of about 3.0 standard drinks of alcohol per week. He reports that he does not currently use drugs.    Home Medications:  Current Outpatient Medications on File Prior to Visit   Medication Sig    amLODIPine-benazepril (Lotrel) 5-20 MG per capsule Take 1 capsule by mouth Daily.    candesartan (ATACAND) 32 MG tablet Take 1 tablet by mouth Daily.    cetirizine (zyrTEC) 10 MG tablet Daily.    metoprolol succinate XL (TOPROL-XL) 25 MG 24 hr tablet TAKE 1 TABLET DAILY    rosuvastatin (CRESTOR) 5 MG tablet Take 1 tablet by mouth Daily.    sildenafil (VIAGRA) 100 MG tablet As Needed.     No current facility-administered medications on file prior to visit.       Past Medical History:   Diagnosis Date    Benign prostatic hyperplasia     Cancer of prostate     Colon polyps     Elevated PSA     Erectile dysfunction     Facial lesion     Heart murmur     Hyperlipidemia     Hypertension     Paroxysmal SVT 2021    Prostatitis     Skin cancer        Allergies:  No Known Allergies    Objective    Objective       Vitals:   Heart Rate:  [52] 52  BP: (142)/(91) 142/91  Body mass index is 29.44 kg/m².     PHYSICAL EXAM:    General Appearance:   well developed  well nourished  HENT:   oropharynx moist  lips not cyanotic  Neck:  thyroid not enlarged  supple  Respiratory:  no respiratory distress  normal breath  sounds  no rales  Cardiovascular:  no jugular venous distention  regular rhythm  apical impulse normal  S1 normal, S2 normal  no S3, no S4   no murmur  no rub, no thrill  carotid pulses normal; no bruit  pedal pulses normal  lower extremity edema: none    Skin:   warm, dry  Psychiatric:  judgement and insight appropriate  normal mood and affect        Result Review:  I have personally reviewed the available results from  [x]  Laboratory  [x]  EKG  [x]  Cardiology  [x]  Medications  [x]  Old records  []  Other:     Procedures  Lab Results   Component Value Date    CHOL 154 11/30/2023     Lab Results   Component Value Date    TRIG 165 (H) 11/30/2023     Lab Results   Component Value Date    HDL 43 11/30/2023     Lab Results   Component Value Date    LDL 83 11/30/2023     Lab Results   Component Value Date    VLDL 28 11/30/2023        Impression/Plan:  1.  Palpitations/PSVT: Continue Toprol-XL 25 mg once a day.  No palpitations.  2.  Mixed hyperlipidemia: Continue Crestor 5 mg once a day.  Monitor lipid and hepatic profile.  3.  Essential hypertension controlled: Continue Atacand 32 mg once a day.  Monitor blood pressure regularly.                 Ilan Kim MD   07/29/25   11:52 EDT

## 2025-07-29 ENCOUNTER — OFFICE VISIT (OUTPATIENT)
Dept: CARDIOLOGY | Facility: CLINIC | Age: 70
End: 2025-07-29
Payer: MEDICARE

## 2025-07-29 VITALS
BODY MASS INDEX: 29.38 KG/M2 | DIASTOLIC BLOOD PRESSURE: 91 MMHG | HEART RATE: 52 BPM | SYSTOLIC BLOOD PRESSURE: 142 MMHG | WEIGHT: 205.2 LBS | HEIGHT: 70 IN

## 2025-07-29 DIAGNOSIS — E78.2 HYPERLIPEMIA, MIXED: ICD-10-CM

## 2025-07-29 DIAGNOSIS — R00.2 PALPITATIONS: Primary | ICD-10-CM

## 2025-07-29 DIAGNOSIS — I10 HYPERTENSION, ESSENTIAL: ICD-10-CM

## 2025-07-29 RX ORDER — CETIRIZINE HYDROCHLORIDE 10 MG/1
TABLET ORAL EVERY 24 HOURS
COMMUNITY
Start: 2025-07-08